# Patient Record
Sex: FEMALE | Race: WHITE | Employment: FULL TIME | URBAN - METROPOLITAN AREA
[De-identification: names, ages, dates, MRNs, and addresses within clinical notes are randomized per-mention and may not be internally consistent; named-entity substitution may affect disease eponyms.]

---

## 2018-11-15 ENCOUNTER — TRANSFERRED RECORDS (OUTPATIENT)
Dept: HEALTH INFORMATION MANAGEMENT | Facility: CLINIC | Age: 43
End: 2018-11-15

## 2018-11-30 ENCOUNTER — TRANSFERRED RECORDS (OUTPATIENT)
Dept: HEALTH INFORMATION MANAGEMENT | Facility: CLINIC | Age: 43
End: 2018-11-30

## 2018-12-04 ENCOUNTER — MEDICAL CORRESPONDENCE (OUTPATIENT)
Dept: HEALTH INFORMATION MANAGEMENT | Facility: CLINIC | Age: 43
End: 2018-12-04

## 2018-12-04 ENCOUNTER — TRANSFERRED RECORDS (OUTPATIENT)
Dept: HEALTH INFORMATION MANAGEMENT | Facility: CLINIC | Age: 43
End: 2018-12-04

## 2018-12-07 ENCOUNTER — TRANSFERRED RECORDS (OUTPATIENT)
Dept: HEALTH INFORMATION MANAGEMENT | Facility: CLINIC | Age: 43
End: 2018-12-07

## 2018-12-11 ENCOUNTER — TRANSFERRED RECORDS (OUTPATIENT)
Dept: HEALTH INFORMATION MANAGEMENT | Facility: CLINIC | Age: 43
End: 2018-12-11

## 2019-01-10 NOTE — TELEPHONE ENCOUNTER
FUTURE VISIT INFORMATION      FUTURE VISIT INFORMATION:    Date: 1/14/19    Time: 2:00PM    [place] [facility] [place]:    Referring provider:  Dr Yonathan Sethi     Referring providers clinic:  [facility]    Reason for visit/diagnosis  multinodule goiter    RECORDS REQUESTED FROM:       Clinic name Comments Records Status Imaging Status   [facility] 12/12/18 telephone encounter    12/11/18 ultrasound-guided thyroid FNA with Dr Kaylee Samuel    3/11/14, 5/1/14, 7/31/14, 2/3/15 notes with Dr Diamond Sy    10/1/15, 4/11/16, 12/9/16, 6/9/17, 12/11/17, 11/30/18 notes with Dr Sethi   Care Everywhere    [facility] ph. [phone]   12/7/18 US Head Neck not Thyroid   12/4/18 XR Chest  11/15/18 US Thyroid  Report: Care Everywhere  PACS   [facility]  12/11/18 thyroid FNA  Report: Care Everywhere                          1/10/19 3:51PM sending a request to [facility] to push above images to [place] PACS via RightFax - Amay  [phone]    1/10/19 5:15PM sent a request to [facility] to send out Thyroid slides via RightFax - Amay     1/14/19 2:25PM received outside slides, sending them to surgical pathology for a consult - Amay

## 2019-01-14 ENCOUNTER — PRE VISIT (OUTPATIENT)
Dept: OTOLARYNGOLOGY | Facility: CLINIC | Age: 44
End: 2019-01-14

## 2019-01-14 ENCOUNTER — OFFICE VISIT (OUTPATIENT)
Dept: OTOLARYNGOLOGY | Facility: CLINIC | Age: 44
End: 2019-01-14
Payer: COMMERCIAL

## 2019-01-14 VITALS — BODY MASS INDEX: 30.66 KG/M2 | HEIGHT: 71 IN | WEIGHT: 219 LBS

## 2019-01-14 DIAGNOSIS — E04.9 SUBSTERNAL GOITER: Primary | ICD-10-CM

## 2019-01-14 RX ORDER — LIRAGLUTIDE 6 MG/ML
INJECTION SUBCUTANEOUS
COMMUNITY
Start: 2018-12-10

## 2019-01-14 RX ORDER — METFORMIN HCL 500 MG
2000 TABLET, EXTENDED RELEASE 24 HR ORAL
COMMUNITY
Start: 2018-11-30

## 2019-01-14 RX ORDER — INSULIN GLARGINE 100 [IU]/ML
INJECTION, SOLUTION SUBCUTANEOUS
COMMUNITY
Start: 2018-12-03

## 2019-01-14 RX ORDER — ESCITALOPRAM OXALATE 20 MG/1
20 TABLET ORAL
COMMUNITY
Start: 2018-10-29

## 2019-01-14 RX ORDER — LANCING DEVICE/LANCETS
KIT MISCELLANEOUS
COMMUNITY
Start: 2017-02-06

## 2019-01-14 RX ORDER — FLUTICASONE PROPIONATE 50 MCG
1-2 SPRAY, SUSPENSION (ML) NASAL
COMMUNITY
Start: 2017-06-28

## 2019-01-14 RX ORDER — SYRINGE-NEEDLE,INSULIN,0.5 ML 27GX1/2"
1 SYRINGE, EMPTY DISPOSABLE MISCELLANEOUS
COMMUNITY
Start: 2016-12-09

## 2019-01-14 RX ORDER — ATORVASTATIN CALCIUM 20 MG/1
20 TABLET, FILM COATED ORAL
COMMUNITY
Start: 2018-11-30

## 2019-01-14 RX ORDER — LANCETS
EACH MISCELLANEOUS
COMMUNITY
Start: 2018-03-07

## 2019-01-14 RX ORDER — LANCETS
1 EACH MISCELLANEOUS
COMMUNITY
Start: 2017-12-11

## 2019-01-14 RX ORDER — ALBUTEROL SULFATE 90 UG/1
2 AEROSOL, METERED RESPIRATORY (INHALATION)
COMMUNITY
Start: 2018-10-29

## 2019-01-14 RX ORDER — CETIRIZINE HYDROCHLORIDE 10 MG/1
1 TABLET ORAL EVERY 24 HOURS
COMMUNITY
Start: 2010-04-26

## 2019-01-14 RX ORDER — EPINEPHRINE 0.3 MG/.3ML
0.3 INJECTION SUBCUTANEOUS
COMMUNITY
Start: 2018-10-29

## 2019-01-14 ASSESSMENT — PAIN SCALES - GENERAL: PAINLEVEL: NO PAIN (0)

## 2019-01-14 ASSESSMENT — MIFFLIN-ST. JEOR: SCORE: 1744.51

## 2019-01-14 NOTE — NURSING NOTE
Chief Complaint   Patient presents with     Consult     multinodule goiter - having trouble swallowing and feeling of pressure in her head when her arms are raised.      Gregorio Casarez, EMT

## 2019-01-14 NOTE — LETTER
"2019       RE: Tanya Oconnor  [address]  [facility] [address]     Dear Colleague,    Thank you for referring your patient, Tanya Oconnor, to the [facility] THRO[facility] at [facility]. Please see a copy of my visit note below.    ENT Surgery Clinic Note      RE: Tanya Oconnor  : 1975  STANISLAV: 2019    HPI:   Tanya Oconnor is a 43 year old female with hx of type II diabetes who presents with multinodular goiter. She reports that symptoms began about 10 years ago, and she has had intermittent dysphagia. This has gradually worsened in size and her PCP recently obtained a thyroid ultrasound. This revealed two nodules which were determined to be benign follicular nodules. Her labs indicate TSH 1.11, FT4 0.9, FT3 2.9, and calcitonin < 2.0.     She denies trouble with voice quality or inspiration. She has not had symptoms of hypo or hyperthyroidism. She is not on thyroid hormone replacement She has no history of head/neck radiation. Her grandfather was on synthroid (she is unsure of why). Otherwise no family history of thyroid disorders or thyroid cancer. 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    Physical examination:     Vitals: Ht 1.803 m (5' 11\")   Wt 99.3 kg (219 lb)   BMI 30.54 kg/m     BMI= Body mass index is 30.54 kg/m .    No acute distress   Trachea deviated to the right  Large multinodular goiter (L>R)  Inferior border of goiter difficult to palpate    Laboratory data:    TSH 1.11  FT4 0.9  FT3 2.9  Calcitonin < 2.0  FSH 6.5  Ca 9.9  Cr 0.69    For details of past medical history, surgical history, family history, medications, allergies, and review of systems, please see details below.    Medical history:  Type II diabetes    Surgical history:  None    Family history:  Grandfather on synthroid (unsure of why)  No family hx of thyroid disorders or cancers    Medications:  Current Outpatient Medications   Medication Sig " "Dispense Refill     albuterol (PROVENTIL HFA) 108 (90 Base) MCG/ACT inhaler Inhale 2 puffs into the lungs       atorvastatin (LIPITOR) 20 MG tablet Take 20 mg by mouth       blood glucose (ACCU-CHEK FASTCLIX) lancing device USE TO TEST 4 TO 8 TIMES DAILY AS DIRECTED       blood glucose monitoring (ACCU-CHEK FASTCLIX) lancets 1 each       blood glucose monitoring (ACCU-CHEK FASTCLIX) lancets        cetirizine (ZYRTEC) 10 MG tablet Take 1 tablet by mouth every 24 hours       CONTOUR NEXT TEST test strip        EPINEPHrine (ADRENACLICK) 0.3 MG/0.3ML injection 2-pack Inject 0.3 mg into the muscle       escitalopram (LEXAPRO) 20 MG tablet Take 20 mg by mouth       fluticasone (FLONASE) 50 MCG/ACT nasal spray 1-2 sprays       insulin glargine (BASAGLAR KWIKPEN) 100 UNIT/ML pen Inject 47 units daily prn insulin pump breaks. DO NOT FILL until requested       insulin lispro (HUMALOG) 100 UNIT/ML vial  UNITS VIA INSULIN PUMP EVERY DAY       insulin pen needle (B-D U/F) 31G X 8 MM miscellaneous Inject 1 each Subcutaneous       insulin syringe-needle U-100 (BD INSULIN SYRINGE ULTRAFINE) 31G X 5/16\" 0.5 ML miscellaneous Inject 1 each Subcutaneous       levonorgestrel (MIRENA, 52 MG,) 20 MCG/24HR IUD 1 each by Intrauterine route       liraglutide (VICTOZA PEN) 18 MG/3ML solution ADMINISTER 1.8 MG UNDER THE SKIN DAILY       metFORMIN (GLUCOPHAGE-XR) 500 MG 24 hr tablet Take 2,000 mg by mouth         Allergies:  The patientis allergic to penicillins.    Social history:  Social History     Tobacco Use     Smoking status: Never Smoker     Smokeless tobacco: Never Used   Substance Use Topics     Alcohol use: Not on file     Marital status: single.    Review of Systems: 10 point ros reviewed and negative except as above.  Nursing Notes:   Gregorio Casarez, EMT  1/14/2019  3:20 PM  Signed  Chief Complaint   Patient presents with     Consult     multinodule goiter - having trouble swallowing and feeling of pressure in her head when " her arms are raised.      Gregorio Casarez, EMT     US Thyroid 11/15/2018    FINDINGS:    RIGHT LOBE:  Measures 6.1 x 2.0 x 2.0 cm. 1.3 x 1.1 x 0.8 cm cystic and solid nodule with no definitive internal vascularity interpolar region. 1.2 x 1.0 x 0.6 cm cystic and solid nodule with no definitive internal vascularity lower pole..    LEFT LOBE: Measures 7.9 x 3.6 x 3.9 cm. 6.3 x 4.1 x 3.5 cm cystic and solid nodule with internal vascularity lower pole    CENTRAL CERVICAL NODE: No lymph nodes seen.    ISTHMUS: 0.5 cm. Appears unremarkable.    IMPRESSION: There are multiple thyroid nodules as above. The largest is in the lower pole of the left lobe of thyroid measures 6.3 cm in greatest dimension.      FNA 12/11/2018       A. Thyroid, left, fine needle aspiration:         - Consistent with a benign follicular nodule.         B. Thyroid, right, fine needle aspiration:         - Consistent with a benign follicular nodule.        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 Discussed the natural history of multinodular goiter and benign findings on her FNA. Given that she has been experiencing symptoms from size of goiter, recommend surgical excision. Discussed total thyroidectomy. This will be done at [facility].  Will obtain CT scan prior to intervention to further characterize anatomic extent of goiter for surgical planning.     Seen with Dr. Ogden.     Mery Hightower MD  General Surgery PGY-3    Referring Provider:  Yonathan Sethi MD  [facility]  [address] [place] [address]     Primary Care Provider:  Suni Lee      I spent >30 minutes in the care and consultation of this patient and agree with the above note. However after reviewing the CT scan there appears to only be involvement of the left lobe of the thyroid and I recommend a LEFT thyroid lobectomy. Surgical procedure and risks discussed with the patient.      Again, thank you  for allowing me to participate in the care of your patient.      Sincerely,    Shakira Ogden MD

## 2019-01-14 NOTE — PATIENT INSTRUCTIONS
1. You can schedule your CT scan prior to surgery.     BEFORE SURGERY:    -NO IBUPROFEN , MOTRIN, ALEVE, GARLIC SUPPLEMENTS, ASPIRIN PRODUCTS  OR FISH OIL FOR 7 DAYS PRIOR TO SURGERY. Tylenol is fine, generally.( You will need specific instructions from primary care if on blood thinners).    -Read pre-operative pamphlets/ booklets, call or contact us with questions.    - Pre-op History and Physical to be done by primary care physician within 30 days of surgery date. This form is in the packet.    - Restrictions on food and fluid the day of surgery as per packet. Generally, nothing solid to eat 8 hours prior to the procedure. Okay to have clear liquids up to 2 hours before (this includes; water, apple juice, black coffee without any cream or sugar).    - Pre-op scrub, neck down, directions are in your packet. Use the night before surgery and the day of surgery.    - You will need a  the day of surgery.        AFTER SURGERY:    - You will follow up after surgery in approximately 3 week(s).    - Call [phone] for scheduling or general questions     For urgent needs after hours call [phone]. You will speak with the hospital  and should ask to have the ENT resident on call paged.    - Please contact our clinic if you note any of the following:          -drainage soaking 3 cotton balls in one hour for 3 consecutive hours          -persistent pain after pain medication          -Fever>100 degrees x 24 hours or longer          -Significant dizziness, especially of new onset          -Any questions or concerns about your care    Ninoska WALKER RNCC [phone]

## 2019-01-15 ENCOUNTER — HOSPITAL ENCOUNTER (OUTPATIENT)
Dept: CT IMAGING | Facility: CLINIC | Age: 44
Discharge: HOME | End: 2019-01-15
Attending: SURGERY | Admitting: SURGERY
Payer: COMMERCIAL

## 2019-01-15 DIAGNOSIS — E04.9 SUBSTERNAL GOITER: ICD-10-CM

## 2019-01-15 LAB
COPATH REPORT: NORMAL
CREAT BLD-MCNC: 0.8 MG/DL (ref 0.52–1.04)
GFR SERPL CREATININE-BSD FRML MDRD: 78 ML/MIN/{1.73_M2}

## 2019-01-15 PROCEDURE — 25000128 H RX IP 250 OP 636: Performed by: SURGERY

## 2019-01-15 PROCEDURE — 25000125 ZZHC RX 250: Performed by: SURGERY

## 2019-01-15 PROCEDURE — 82565 ASSAY OF CREATININE: CPT

## 2019-01-15 PROCEDURE — 70491 CT SOFT TISSUE NECK W/DYE: CPT

## 2019-01-15 PROCEDURE — 00000346 ZZHCL STATISTIC REVIEW OUTSIDE SLIDES TC 88321: Performed by: SURGERY

## 2019-01-15 RX ORDER — IOPAMIDOL 755 MG/ML
80 INJECTION, SOLUTION INTRAVASCULAR ONCE
Status: COMPLETED | OUTPATIENT
Start: 2019-01-15 | End: 2019-01-15

## 2019-01-15 RX ADMIN — SODIUM CHLORIDE 60 ML: 9 INJECTION, SOLUTION INTRAVENOUS at 07:42

## 2019-01-15 RX ADMIN — IOPAMIDOL 80 ML: 755 INJECTION, SOLUTION INTRAVENOUS at 07:37

## 2019-01-15 NOTE — PROGRESS NOTES
"ENT Surgery Clinic Note      RE: Tanya Oconnor  : 1975  STANISLAV: 2019    HPI:   Tanya Oconnor is a 43 year old female with hx of type II diabetes who presents with multinodular goiter. She reports that symptoms began about 10 years ago, and she has had intermittent dysphagia. This has gradually worsened in size and her PCP recently obtained a thyroid ultrasound. This revealed two nodules which were determined to be benign follicular nodules. Her labs indicate TSH 1.11, FT4 0.9, FT3 2.9, and calcitonin < 2.0.     She denies trouble with voice quality or inspiration. She has not had symptoms of hypo or hyperthyroidism. She is not on thyroid hormone replacement She has no history of head/neck radiation. Her grandfather was on synthroid (she is unsure of why). Otherwise no family history of thyroid disorders or thyroid cancer. 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    Physical examination:     Vitals: Ht 1.803 m (5' 11\")   Wt 99.3 kg (219 lb)   BMI 30.54 kg/m    BMI= Body mass index is 30.54 kg/m .    No acute distress   Trachea deviated to the right  Large multinodular goiter (L>R)  Inferior border of goiter difficult to palpate    Laboratory data:    TSH 1.11  FT4 0.9  FT3 2.9  Calcitonin < 2.0  FSH 6.5  Ca 9.9  Cr 0.69    For details of past medical history, surgical history, family history, medications, allergies, and review of systems, please see details below.    Medical history:  Type II diabetes    Surgical history:  None    Family history:  Grandfather on synthroid (unsure of why)  No family hx of thyroid disorders or cancers    Medications:  Current Outpatient Medications   Medication Sig Dispense Refill     albuterol (PROVENTIL HFA) 108 (90 Base) MCG/ACT inhaler Inhale 2 puffs into the lungs       atorvastatin (LIPITOR) 20 MG tablet Take 20 mg by mouth       blood glucose (ACCU-CHEK FASTCLIX) lancing device USE TO TEST 4 TO 8 TIMES DAILY AS DIRECTED       blood glucose " "monitoring (ACCU-CHEK FASTCLIX) lancets 1 each       blood glucose monitoring (ACCU-CHEK FASTCLIX) lancets        cetirizine (ZYRTEC) 10 MG tablet Take 1 tablet by mouth every 24 hours       CONTOUR NEXT TEST test strip        EPINEPHrine (ADRENACLICK) 0.3 MG/0.3ML injection 2-pack Inject 0.3 mg into the muscle       escitalopram (LEXAPRO) 20 MG tablet Take 20 mg by mouth       fluticasone (FLONASE) 50 MCG/ACT nasal spray 1-2 sprays       insulin glargine (BASAGLAR KWIKPEN) 100 UNIT/ML pen Inject 47 units daily prn insulin pump breaks. DO NOT FILL until requested       insulin lispro (HUMALOG) 100 UNIT/ML vial  UNITS VIA INSULIN PUMP EVERY DAY       insulin pen needle (B-D U/F) 31G X 8 MM miscellaneous Inject 1 each Subcutaneous       insulin syringe-needle U-100 (BD INSULIN SYRINGE ULTRAFINE) 31G X 5/16\" 0.5 ML miscellaneous Inject 1 each Subcutaneous       levonorgestrel (MIRENA, 52 MG,) 20 MCG/24HR IUD 1 each by Intrauterine route       liraglutide (VICTOZA PEN) 18 MG/3ML solution ADMINISTER 1.8 MG UNDER THE SKIN DAILY       metFORMIN (GLUCOPHAGE-XR) 500 MG 24 hr tablet Take 2,000 mg by mouth         Allergies:  The patientis allergic to penicillins.    Social history:  Social History     Tobacco Use     Smoking status: Never Smoker     Smokeless tobacco: Never Used   Substance Use Topics     Alcohol use: Not on file     Marital status: single.    Review of Systems: 10 point ros reviewed and negative except as above.  Nursing Notes:   Gregorio Casarez, EMT  1/14/2019  3:20 PM  Signed  Chief Complaint   Patient presents with     Consult     multinodule goiter - having trouble swallowing and feeling of pressure in her head when her arms are raised.      Gregorio Casarez, EMT     US Thyroid 11/15/2018    FINDINGS:    RIGHT LOBE:  Measures 6.1 x 2.0 x 2.0 cm. 1.3 x 1.1 x 0.8 cm cystic and solid nodule with no definitive internal vascularity interpolar region. 1.2 x 1.0 x 0.6 cm cystic and solid nodule with no " definitive internal vascularity lower pole..    LEFT LOBE: Measures 7.9 x 3.6 x 3.9 cm. 6.3 x 4.1 x 3.5 cm cystic and solid nodule with internal vascularity lower pole    CENTRAL CERVICAL NODE: No lymph nodes seen.    ISTHMUS: 0.5 cm. Appears unremarkable.    IMPRESSION: There are multiple thyroid nodules as above. The largest is in the lower pole of the left lobe of thyroid measures 6.3 cm in greatest dimension.      FNA 12/11/2018       A. Thyroid, left, fine needle aspiration:         - Consistent with a benign follicular nodule.         B. Thyroid, right, fine needle aspiration:         - Consistent with a benign follicular nodule.        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 Discussed the natural history of multinodular goiter and benign findings on her FNA. Given that she has been experiencing symptoms from size of goiter, recommend surgical excision. Discussed total thyroidectomy. [facility] be done at [facility].  Will obtain CT scan prior to intervention to further characterize anatomic extent of goiter for surgical planning.     Seen with Dr. Ogden.     Mery Hightower MD  General Surgery PGY-3    Referring Provider:  Yonathan Sethi MD  [facility]  [address] [place] [address]     Primary Care Provider:  Suni Lee      I spent >30 minutes in the care and consultation of this patient and agree with the above note. However after reviewing the CT scan there appears to only be involvement of the left lobe of the thyroid and I recommend a LEFT thyroid lobectomy. Surgical procedure and risks discussed with the patient.

## 2019-01-29 ENCOUNTER — DOCUMENTATION ONLY (OUTPATIENT)
Dept: OTOLARYNGOLOGY | Facility: CLINIC | Age: 44
End: 2019-01-29

## 2019-01-29 NOTE — PROGRESS NOTES
Patient is scheduled for surgery with Dr. Ogden    Proc:  Left thryoid lobectomy and isthmusectomy    Spoke or left message with: spoke with patient    Date of Surgery: 5/22/19    Location: [facility]    H&P: Scheduled with [facility]     Additional imaging/appointments: post op 6/3/19  1:30    Surgery packet: given at appt        Liss Knutson   ENT [place]-Op Coordinator  [phone]

## 2019-02-15 ENCOUNTER — HEALTH MAINTENANCE LETTER (OUTPATIENT)
Age: 44
End: 2019-02-15

## 2019-05-21 ENCOUNTER — ANESTHESIA EVENT (OUTPATIENT)
Dept: SURGERY | Facility: CLINIC | Age: 44
End: 2019-05-21
Payer: COMMERCIAL

## 2019-05-22 ENCOUNTER — ANESTHESIA (OUTPATIENT)
Dept: SURGERY | Facility: CLINIC | Age: 44
End: 2019-05-22
Payer: COMMERCIAL

## 2019-05-22 ENCOUNTER — HOSPITAL ENCOUNTER (OUTPATIENT)
Facility: CLINIC | Age: 44
Setting detail: OBSERVATION
Discharge: HOME | End: 2019-05-23
Attending: SURGERY | Admitting: SURGERY
Payer: COMMERCIAL

## 2019-05-22 DIAGNOSIS — E04.9 GOITER: Primary | ICD-10-CM

## 2019-05-22 LAB
CREAT SERPL-MCNC: 0.79 MG/DL (ref 0.52–1.04)
GFR SERPL CREATININE-BSD FRML MDRD: >90 ML/MIN/{1.73_M2}
GLUCOSE BLDC GLUCOMTR-MCNC: 122 MG/DL (ref 70–99)
GLUCOSE BLDC GLUCOMTR-MCNC: 136 MG/DL (ref 70–99)
GLUCOSE BLDC GLUCOMTR-MCNC: 145 MG/DL (ref 70–99)
GLUCOSE BLDC GLUCOMTR-MCNC: 149 MG/DL (ref 70–99)
GLUCOSE BLDC GLUCOMTR-MCNC: 158 MG/DL (ref 70–99)
HBA1C MFR BLD: 5.9 % (ref 0–5.6)
HCG UR QL: NEGATIVE
POTASSIUM SERPL-SCNC: 3.8 MMOL/L (ref 3.4–5.3)

## 2019-05-22 PROCEDURE — 25000132 ZZH RX MED GY IP 250 OP 250 PS 637: Performed by: ANESTHESIOLOGY

## 2019-05-22 PROCEDURE — 27211024 ZZHC OR SUPPLY OTHER OPNP: Performed by: SURGERY

## 2019-05-22 PROCEDURE — 25000132 ZZH RX MED GY IP 250 OP 250 PS 637: Performed by: STUDENT IN AN ORGANIZED HEALTH CARE EDUCATION/TRAINING PROGRAM

## 2019-05-22 PROCEDURE — 81025 URINE PREGNANCY TEST: CPT | Performed by: ANESTHESIOLOGY

## 2019-05-22 PROCEDURE — 83036 HEMOGLOBIN GLYCOSYLATED A1C: CPT | Performed by: STUDENT IN AN ORGANIZED HEALTH CARE EDUCATION/TRAINING PROGRAM

## 2019-05-22 PROCEDURE — 82565 ASSAY OF CREATININE: CPT | Performed by: ANESTHESIOLOGY

## 2019-05-22 PROCEDURE — 71000014 ZZH RECOVERY PHASE 1 LEVEL 2 FIRST HR: Performed by: SURGERY

## 2019-05-22 PROCEDURE — 25800030 ZZH RX IP 258 OP 636: Performed by: ANESTHESIOLOGY

## 2019-05-22 PROCEDURE — 40000170 ZZH STATISTIC PRE-PROCEDURE ASSESSMENT II: Performed by: SURGERY

## 2019-05-22 PROCEDURE — 25000565 ZZH ISOFLURANE, EA 15 MIN: Performed by: SURGERY

## 2019-05-22 PROCEDURE — 36000064 ZZH SURGERY LEVEL 4 EA 15 ADDTL MIN - UMMC: Performed by: SURGERY

## 2019-05-22 PROCEDURE — 36415 COLL VENOUS BLD VENIPUNCTURE: CPT | Performed by: ANESTHESIOLOGY

## 2019-05-22 PROCEDURE — 37000008 ZZH ANESTHESIA TECHNICAL FEE, 1ST 30 MIN: Performed by: SURGERY

## 2019-05-22 PROCEDURE — 00000146 ZZHCL STATISTIC GLUCOSE BY METER IP

## 2019-05-22 PROCEDURE — 27210794 ZZH OR GENERAL SUPPLY STERILE: Performed by: SURGERY

## 2019-05-22 PROCEDURE — 88331 PATH CONSLTJ SURG 1 BLK 1SPC: CPT | Performed by: SURGERY

## 2019-05-22 PROCEDURE — 25800030 ZZH RX IP 258 OP 636: Performed by: NURSE ANESTHETIST, CERTIFIED REGISTERED

## 2019-05-22 PROCEDURE — 25000128 H RX IP 250 OP 636: Performed by: NURSE ANESTHETIST, CERTIFIED REGISTERED

## 2019-05-22 PROCEDURE — 25000125 ZZHC RX 250: Performed by: NURSE ANESTHETIST, CERTIFIED REGISTERED

## 2019-05-22 PROCEDURE — 25000125 ZZHC RX 250: Performed by: ANESTHESIOLOGY

## 2019-05-22 PROCEDURE — 88307 TISSUE EXAM BY PATHOLOGIST: CPT | Performed by: SURGERY

## 2019-05-22 PROCEDURE — 71000015 ZZH RECOVERY PHASE 1 LEVEL 2 EA ADDTL HR: Performed by: SURGERY

## 2019-05-22 PROCEDURE — 84132 ASSAY OF SERUM POTASSIUM: CPT | Performed by: ANESTHESIOLOGY

## 2019-05-22 PROCEDURE — 12000001 ZZH R&B MED SURG/OB UMMC

## 2019-05-22 PROCEDURE — 25000128 H RX IP 250 OP 636: Performed by: ANESTHESIOLOGY

## 2019-05-22 PROCEDURE — 37000009 ZZH ANESTHESIA TECHNICAL FEE, EACH ADDTL 15 MIN: Performed by: SURGERY

## 2019-05-22 PROCEDURE — 36000062 ZZH SURGERY LEVEL 4 1ST 30 MIN - UMMC: Performed by: SURGERY

## 2019-05-22 PROCEDURE — 25000127 ZZH RX IP 250 OP 272: Performed by: SURGERY

## 2019-05-22 PROCEDURE — 36415 COLL VENOUS BLD VENIPUNCTURE: CPT | Performed by: STUDENT IN AN ORGANIZED HEALTH CARE EDUCATION/TRAINING PROGRAM

## 2019-05-22 RX ORDER — NICOTINE POLACRILEX 4 MG
15-30 LOZENGE BUCCAL
Status: DISCONTINUED | OUTPATIENT
Start: 2019-05-22 | End: 2019-05-23 | Stop reason: HOSPADM

## 2019-05-22 RX ORDER — DEXTROSE MONOHYDRATE 25 G/50ML
25-50 INJECTION, SOLUTION INTRAVENOUS
Status: DISCONTINUED | OUTPATIENT
Start: 2019-05-22 | End: 2019-05-23 | Stop reason: HOSPADM

## 2019-05-22 RX ORDER — NALOXONE HYDROCHLORIDE 0.4 MG/ML
.1-.4 INJECTION, SOLUTION INTRAMUSCULAR; INTRAVENOUS; SUBCUTANEOUS
Status: DISCONTINUED | OUTPATIENT
Start: 2019-05-22 | End: 2019-05-22 | Stop reason: HOSPADM

## 2019-05-22 RX ORDER — LIDOCAINE 40 MG/G
CREAM TOPICAL
Status: DISCONTINUED | OUTPATIENT
Start: 2019-05-22 | End: 2019-05-23 | Stop reason: HOSPADM

## 2019-05-22 RX ORDER — SCOLOPAMINE TRANSDERMAL SYSTEM 1 MG/1
1 PATCH, EXTENDED RELEASE TRANSDERMAL
Status: DISCONTINUED | OUTPATIENT
Start: 2019-05-22 | End: 2019-05-22

## 2019-05-22 RX ORDER — DEXTROSE MONOHYDRATE 25 G/50ML
25-50 INJECTION, SOLUTION INTRAVENOUS
Status: DISCONTINUED | OUTPATIENT
Start: 2019-05-22 | End: 2019-05-22

## 2019-05-22 RX ORDER — OXYCODONE HYDROCHLORIDE 5 MG/1
5 TABLET ORAL EVERY 6 HOURS PRN
Qty: 12 TABLET | Refills: 0 | Status: SHIPPED | OUTPATIENT
Start: 2019-05-22 | End: 2019-05-25

## 2019-05-22 RX ORDER — LIDOCAINE 40 MG/G
CREAM TOPICAL
Status: DISCONTINUED | OUTPATIENT
Start: 2019-05-22 | End: 2019-05-22 | Stop reason: HOSPADM

## 2019-05-22 RX ORDER — SODIUM CHLORIDE, SODIUM LACTATE, POTASSIUM CHLORIDE, CALCIUM CHLORIDE 600; 310; 30; 20 MG/100ML; MG/100ML; MG/100ML; MG/100ML
INJECTION, SOLUTION INTRAVENOUS CONTINUOUS
Status: DISCONTINUED | OUTPATIENT
Start: 2019-05-22 | End: 2019-05-22 | Stop reason: HOSPADM

## 2019-05-22 RX ORDER — HYDROMORPHONE HYDROCHLORIDE 1 MG/ML
.3-.5 INJECTION, SOLUTION INTRAMUSCULAR; INTRAVENOUS; SUBCUTANEOUS EVERY 10 MIN PRN
Status: DISCONTINUED | OUTPATIENT
Start: 2019-05-22 | End: 2019-05-22 | Stop reason: HOSPADM

## 2019-05-22 RX ORDER — MEPERIDINE HYDROCHLORIDE 25 MG/ML
12.5 INJECTION INTRAMUSCULAR; INTRAVENOUS; SUBCUTANEOUS
Status: DISCONTINUED | OUTPATIENT
Start: 2019-05-22 | End: 2019-05-22 | Stop reason: HOSPADM

## 2019-05-22 RX ORDER — PROPOFOL 10 MG/ML
INJECTION, EMULSION INTRAVENOUS CONTINUOUS PRN
Status: DISCONTINUED | OUTPATIENT
Start: 2019-05-22 | End: 2019-05-22

## 2019-05-22 RX ORDER — FENTANYL CITRATE 50 UG/ML
25-50 INJECTION, SOLUTION INTRAMUSCULAR; INTRAVENOUS
Status: DISCONTINUED | OUTPATIENT
Start: 2019-05-22 | End: 2019-05-22 | Stop reason: HOSPADM

## 2019-05-22 RX ORDER — LIDOCAINE HYDROCHLORIDE 20 MG/ML
INJECTION, SOLUTION INFILTRATION; PERINEURAL PRN
Status: DISCONTINUED | OUTPATIENT
Start: 2019-05-22 | End: 2019-05-22

## 2019-05-22 RX ORDER — OXYCODONE HYDROCHLORIDE 5 MG/1
5-10 TABLET ORAL EVERY 4 HOURS PRN
Status: DISCONTINUED | OUTPATIENT
Start: 2019-05-22 | End: 2019-05-22

## 2019-05-22 RX ORDER — DEXAMETHASONE SODIUM PHOSPHATE 10 MG/ML
INJECTION, SOLUTION INTRAMUSCULAR; INTRAVENOUS PRN
Status: DISCONTINUED | OUTPATIENT
Start: 2019-05-22 | End: 2019-05-22

## 2019-05-22 RX ORDER — FENTANYL CITRATE 50 UG/ML
INJECTION, SOLUTION INTRAMUSCULAR; INTRAVENOUS PRN
Status: DISCONTINUED | OUTPATIENT
Start: 2019-05-22 | End: 2019-05-22

## 2019-05-22 RX ORDER — CETIRIZINE HYDROCHLORIDE 10 MG/1
10 TABLET ORAL EVERY 24 HOURS
Status: DISCONTINUED | OUTPATIENT
Start: 2019-05-23 | End: 2019-05-23 | Stop reason: HOSPADM

## 2019-05-22 RX ORDER — NICOTINE POLACRILEX 4 MG
15-30 LOZENGE BUCCAL
Status: DISCONTINUED | OUTPATIENT
Start: 2019-05-22 | End: 2019-05-22

## 2019-05-22 RX ORDER — ALBUTEROL SULFATE 90 UG/1
2 AEROSOL, METERED RESPIRATORY (INHALATION) EVERY 4 HOURS PRN
Status: DISCONTINUED | OUTPATIENT
Start: 2019-05-22 | End: 2019-05-23 | Stop reason: HOSPADM

## 2019-05-22 RX ORDER — ONDANSETRON 2 MG/ML
4 INJECTION INTRAMUSCULAR; INTRAVENOUS EVERY 30 MIN PRN
Status: DISCONTINUED | OUTPATIENT
Start: 2019-05-22 | End: 2019-05-22 | Stop reason: HOSPADM

## 2019-05-22 RX ORDER — ACETAMINOPHEN 325 MG/1
975 TABLET ORAL ONCE
Status: DISCONTINUED | OUTPATIENT
Start: 2019-05-22 | End: 2019-05-22 | Stop reason: HOSPADM

## 2019-05-22 RX ORDER — PROPOFOL 10 MG/ML
INJECTION, EMULSION INTRAVENOUS PRN
Status: DISCONTINUED | OUTPATIENT
Start: 2019-05-22 | End: 2019-05-22

## 2019-05-22 RX ORDER — ACETAMINOPHEN 325 MG/1
975 TABLET ORAL EVERY 4 HOURS PRN
Status: DISCONTINUED | OUTPATIENT
Start: 2019-05-22 | End: 2019-05-22

## 2019-05-22 RX ORDER — SODIUM CHLORIDE, SODIUM LACTATE, POTASSIUM CHLORIDE, CALCIUM CHLORIDE 600; 310; 30; 20 MG/100ML; MG/100ML; MG/100ML; MG/100ML
INJECTION, SOLUTION INTRAVENOUS CONTINUOUS PRN
Status: DISCONTINUED | OUTPATIENT
Start: 2019-05-22 | End: 2019-05-22

## 2019-05-22 RX ORDER — ESCITALOPRAM OXALATE 20 MG/1
20 TABLET ORAL AT BEDTIME
Status: DISCONTINUED | OUTPATIENT
Start: 2019-05-22 | End: 2019-05-23

## 2019-05-22 RX ORDER — OXYCODONE HCL 5 MG/5 ML
5 SOLUTION, ORAL ORAL EVERY 4 HOURS PRN
Status: DISCONTINUED | OUTPATIENT
Start: 2019-05-22 | End: 2019-05-23 | Stop reason: HOSPADM

## 2019-05-22 RX ORDER — ONDANSETRON 4 MG/1
4 TABLET, ORALLY DISINTEGRATING ORAL EVERY 30 MIN PRN
Status: DISCONTINUED | OUTPATIENT
Start: 2019-05-22 | End: 2019-05-22 | Stop reason: HOSPADM

## 2019-05-22 RX ORDER — ATORVASTATIN CALCIUM 20 MG/1
20 TABLET, FILM COATED ORAL DAILY
Status: DISCONTINUED | OUTPATIENT
Start: 2019-05-23 | End: 2019-05-23 | Stop reason: HOSPADM

## 2019-05-22 RX ORDER — ACETAMINOPHEN 325 MG/1
650 TABLET ORAL EVERY 6 HOURS PRN
Status: DISCONTINUED | OUTPATIENT
Start: 2019-05-22 | End: 2019-05-23 | Stop reason: HOSPADM

## 2019-05-22 RX ORDER — ONDANSETRON 2 MG/ML
INJECTION INTRAMUSCULAR; INTRAVENOUS PRN
Status: DISCONTINUED | OUTPATIENT
Start: 2019-05-22 | End: 2019-05-22

## 2019-05-22 RX ADMIN — PHENYLEPHRINE HYDROCHLORIDE 100 MCG: 10 INJECTION INTRAVENOUS at 09:36

## 2019-05-22 RX ADMIN — FENTANYL CITRATE 25 MCG: 50 INJECTION, SOLUTION INTRAMUSCULAR; INTRAVENOUS at 11:25

## 2019-05-22 RX ADMIN — Medication 0.3 MG: at 11:49

## 2019-05-22 RX ADMIN — OXYCODONE HYDROCHLORIDE 5 MG: 5 SOLUTION ORAL at 17:32

## 2019-05-22 RX ADMIN — SCOPALAMINE 1 PATCH: 1 PATCH, EXTENDED RELEASE TRANSDERMAL at 07:14

## 2019-05-22 RX ADMIN — DEXAMETHASONE SODIUM PHOSPHATE 4 MG: 10 INJECTION, SOLUTION INTRAMUSCULAR; INTRAVENOUS at 08:43

## 2019-05-22 RX ADMIN — PROPOFOL 150 MG: 10 INJECTION, EMULSION INTRAVENOUS at 08:42

## 2019-05-22 RX ADMIN — Medication 0.4 MG: at 12:09

## 2019-05-22 RX ADMIN — ACETAMINOPHEN 975 MG: 325 TABLET, FILM COATED ORAL at 06:57

## 2019-05-22 RX ADMIN — LIDOCAINE HYDROCHLORIDE 100 MG: 20 INJECTION, SOLUTION INFILTRATION; PERINEURAL at 08:41

## 2019-05-22 RX ADMIN — PHENYLEPHRINE HYDROCHLORIDE 0.05 MCG/KG/MIN: 10 INJECTION INTRAVENOUS at 09:46

## 2019-05-22 RX ADMIN — OXYCODONE HYDROCHLORIDE 5 MG: 5 SOLUTION ORAL at 21:41

## 2019-05-22 RX ADMIN — PHENYLEPHRINE HYDROCHLORIDE 100 MCG: 10 INJECTION INTRAVENOUS at 09:28

## 2019-05-22 RX ADMIN — ACETAMINOPHEN 650 MG: 325 TABLET, FILM COATED ORAL at 21:41

## 2019-05-22 RX ADMIN — PHENYLEPHRINE HYDROCHLORIDE 200 MCG: 10 INJECTION INTRAVENOUS at 09:18

## 2019-05-22 RX ADMIN — FENTANYL CITRATE 100 MCG: 50 INJECTION, SOLUTION INTRAMUSCULAR; INTRAVENOUS at 08:38

## 2019-05-22 RX ADMIN — MIDAZOLAM 2 MG: 1 INJECTION INTRAMUSCULAR; INTRAVENOUS at 08:35

## 2019-05-22 RX ADMIN — PHENYLEPHRINE HYDROCHLORIDE 100 MCG: 10 INJECTION INTRAVENOUS at 09:33

## 2019-05-22 RX ADMIN — FENTANYL CITRATE 50 MCG: 50 INJECTION, SOLUTION INTRAMUSCULAR; INTRAVENOUS at 11:20

## 2019-05-22 RX ADMIN — PROPOFOL 50 MCG/KG/MIN: 10 INJECTION, EMULSION INTRAVENOUS at 08:55

## 2019-05-22 RX ADMIN — SODIUM CHLORIDE, POTASSIUM CHLORIDE, SODIUM LACTATE AND CALCIUM CHLORIDE: 600; 310; 30; 20 INJECTION, SOLUTION INTRAVENOUS at 08:53

## 2019-05-22 RX ADMIN — Medication 100 MG: at 08:42

## 2019-05-22 RX ADMIN — FENTANYL CITRATE 25 MCG: 50 INJECTION, SOLUTION INTRAMUSCULAR; INTRAVENOUS at 11:42

## 2019-05-22 RX ADMIN — PROPOFOL 50 MG: 10 INJECTION, EMULSION INTRAVENOUS at 08:49

## 2019-05-22 RX ADMIN — OXYCODONE HYDROCHLORIDE 5 MG: 5 SOLUTION ORAL at 13:03

## 2019-05-22 RX ADMIN — REMIFENTANIL HYDROCHLORIDE 0.05 MCG/KG/MIN: 1 INJECTION, POWDER, LYOPHILIZED, FOR SOLUTION INTRAVENOUS at 08:51

## 2019-05-22 RX ADMIN — SODIUM CHLORIDE, POTASSIUM CHLORIDE, SODIUM LACTATE AND CALCIUM CHLORIDE: 600; 310; 30; 20 INJECTION, SOLUTION INTRAVENOUS at 08:31

## 2019-05-22 RX ADMIN — ONDANSETRON 8 MG: 2 INJECTION INTRAMUSCULAR; INTRAVENOUS at 10:26

## 2019-05-22 RX ADMIN — ACETAMINOPHEN 650 MG: 325 TABLET, FILM COATED ORAL at 15:04

## 2019-05-22 ASSESSMENT — ACTIVITIES OF DAILY LIVING (ADL)
RETIRED_EATING: 0-->INDEPENDENT
ADLS_ACUITY_SCORE: 11
TRANSFERRING: 0-->INDEPENDENT
SWALLOWING: 2-->DIFFICULTY SWALLOWING LIQUIDS/FOODS
TOILETING: 0-->INDEPENDENT
AMBULATION: 0-->INDEPENDENT
BATHING: 0-->INDEPENDENT
ADLS_ACUITY_SCORE: 17
RETIRED_COMMUNICATION: 0-->UNDERSTANDS/COMMUNICATES WITHOUT DIFFICULTY
FALL_HISTORY_WITHIN_LAST_SIX_MONTHS: NO
DRESS: 0-->INDEPENDENT
COGNITION: 0 - NO COGNITION ISSUES REPORTED

## 2019-05-22 ASSESSMENT — PAIN DESCRIPTION - DESCRIPTORS
DESCRIPTORS: ACHING
DESCRIPTORS: SORE
DESCRIPTORS: ACHING
DESCRIPTORS: ACHING;SORE
DESCRIPTORS: ACHING

## 2019-05-22 ASSESSMENT — MIFFLIN-ST. JEOR: SCORE: 1731.13

## 2019-05-22 NOTE — PLAN OF CARE
Writer responsible for patient [id number]. AVSS on RA, Capno WNL. A+OX4. Pain controlled with prn oxycodone and tylenol. Denies nausea, moderate carb diet with good intake. Started on ambulatory insulin infusion pump, see MAR for details. Neck incision sutured c/d/I. FABIOLA to bulb sx with brown output, c/d/I. PIV's SL'd. UAL, steady on feet, ambulating long distances in hallway.     Continue POC.

## 2019-05-22 NOTE — DISCHARGE SUMMARY
SURGICAL ONCOLOGY DISCHARGE SUMMARY     NAME: Tanya Oconnor   MRN: [id number]   : 1975     DATE OF ADMISSION: 2019     PRE/POSTOPERATIVE DIAGNOSES: Left thyroid goiter     PROCEDURES PERFORMED: Left thyroid lobectomy     PATHOLOGY RESULTS: Pending      CULTURE RESULTS: None     INTRAOPERATIVE COMPLICATIONS: None     POSTOPERATIVE COMPLICATIONS: None     DRAINS/TUBES PRESENT AT DISCHARGE: None    DATE OF DISCHARGE:      HOSPITAL COURSE: Tanya Oconnor is a 43 year old female who on 2019  underwent the above-named procedures.  she tolerated the operation well and postoperatively was transferred to the general post-surgical unit.  The remainder of her course was essentially uncomplicated.  Prior to discharge, her pain was controlled well, she was able to perform ADLs and ambulate independently without difficulty. Her FABIOLA drain output was minimal and it was removed. She was discharged to home in stable condition     DISCHARGE EXAM:   A&O, NAD  Resp non-labored  Distal extremities warm    Incisions C/D     DISCHARGE INSTRUCTIONS:  No discharge procedures on file.    DISCHARGE MEDICATIONS:   Current Discharge Medication List      CONTINUE these medications which have NOT CHANGED    Details   atorvastatin (LIPITOR) 20 MG tablet Take 20 mg by mouth      cetirizine (ZYRTEC) 10 MG tablet Take 1 tablet by mouth every 24 hours      escitalopram (LEXAPRO) 20 MG tablet Take 20 mg by mouth      insulin lispro (HUMALOG) 100 UNIT/ML vial  UNITS VIA INSULIN PUMP EVERY DAY      levonorgestrel (MIRENA, 52 MG,) 20 MCG/24HR IUD 1 each by Intrauterine route      liraglutide (VICTOZA PEN) 18 MG/3ML solution ADMINISTER 1.8 MG UNDER THE SKIN DAILY      metFORMIN (GLUCOPHAGE-XR) 500 MG 24 hr tablet Take 2,000 mg by mouth      albuterol (PROVENTIL HFA) 108 (90 Base) MCG/ACT inhaler Inhale 2 puffs into the lungs      blood glucose (ACCU-CHEK FASTCLIX) lancing device USE TO TEST 4 TO 8 TIMES DAILY AS DIRECTED      !!  "blood glucose monitoring (ACCU-CHEK FASTCLIX) lancets 1 each      !! blood glucose monitoring (ACCU-CHEK FASTCLIX) lancets       CONTOUR NEXT TEST test strip       EPINEPHrine (ADRENACLICK) 0.3 MG/0.3ML injection 2-pack Inject 0.3 mg into the muscle      fluticasone (FLONASE) 50 MCG/ACT nasal spray 1-2 sprays      insulin glargine (BASAGLAR KWIKPEN) 100 UNIT/ML pen Inject 47 units daily prn insulin pump breaks. DO NOT FILL until requested      insulin pen needle (B-D U/F) 31G X 8 MM miscellaneous Inject 1 each Subcutaneous      insulin syringe-needle U-100 (BD INSULIN SYRINGE ULTRAFINE) 31G X 5/16\" 0.5 ML miscellaneous Inject 1 each Subcutaneous       !! - Potential duplicate medications found. Please discuss with provider.                   "

## 2019-05-22 NOTE — ANESTHESIA POSTPROCEDURE EVALUATION
Anesthesia POST Procedure Evaluation    Patient: Tanya Oconnor   MRN:     [id number] Gender:   female   Age:    43 year old :      1975        Preoperative Diagnosis: Substernal Goiter   Procedure(s):  Left Thyroid Lobectomy   Postop Comments: No value filed.       Anesthesia Type:  General  No value filed.    Reportable Event: NO     PAIN: Uncomplicated   Sign Out status: Comfortable, Well controlled pain     PONV: No PONV   Sign Out status:  No Nausea or Vomiting     Neuro/Psych: Uneventful perioperative course   Sign Out Status: Preoperative baseline; Age appropriate mentation     Airway/Resp.: Uneventful perioperative course   Sign Out Status: Non labored breathing, age appropriate RR; Resp. Status within EXPECTED Parameters     CV: Uneventful perioperative course   Sign Out status: Appropriate BP and perfusion indices; Appropriate HR/Rhythm     Disposition:   Sign Out in:  PACU  Disposition:  Floor  Recovery Course: Uneventful  Follow-Up: Not required     Comments/Narrative:  Being admitted to obs for high bleeding risk due to size of mass.           Last Anesthesia Record Vitals:  CRNA VITALS  2019 1041 - 2019 1141      2019             EKG:  Sinus rhythm          Last PACU Vitals:  Vitals Value Taken Time   /78 2019 12:10 PM   Temp 36.7  C (98  F) 2019 11:45 AM   Pulse 92 2019 12:10 PM   Resp 10 2019 11:45 AM   SpO2 98 % 2019 12:17 PM   Temp src     NIBP     Pulse     SpO2     Resp     Temp     Ht Rate     Temp 2     Vitals shown include unvalidated device data.      Electronically Signed By: Brook Sahu MD, May 22, 2019, 12:18 PM

## 2019-05-22 NOTE — PLAN OF CARE
Patient arrived on unit 7C @ 1345. A&O x4 and ambulated to hospital bed from stretcher. Denies N/V, tolerating ice chips and water, denies dizziness. Pain adequately managed /w PRN Tylenol and PRN Oxycodone 5mg. AVSS on RA, capno in place.    Juana Moreno RN on 5/22/2019 at 3:31 PM

## 2019-05-22 NOTE — ANESTHESIA PREPROCEDURE EVALUATION
"Anesthesia Pre-Procedure Evaluation    Patient: Tanya Oconnor   MRN:     [id number] Gender:   female   Age:    43 year old :      1975        Preoperative Diagnosis: Substernal Goiter   Procedure(s):  Left Thyroid Lobectomy And Isthmusectomy     Past Medical History:   Diagnosis Date     Diabetes (H)      Thyroid disease      Uncomplicated asthma       History reviewed. No pertinent surgical history.       Anesthesia Evaluation     . Pt has not had prior anesthetic            ROS/MED HX    ENT/Pulmonary:     (+)Intermittent asthma (No inhaler use x mnay months) , . .    Neurologic:       Cardiovascular:         METS/Exercise Tolerance:  >4 METS   Hematologic:         Musculoskeletal:         GI/Hepatic:        (-) GERD   Renal/Genitourinary:         Endo:     (+) type II DM Using insulin - using insulin pump thyroid problem (thyroid nodule) .      Psychiatric:     (+) psychiatric history depression      Infectious Disease:         Malignancy:         Other:    (+) no H/O Chronic Pain,                       PHYSICAL EXAM:   Mental Status/Neuro:    Airway: Facies: Feasible  Mallampati: I  Mouth/Opening: Not Assessed  TM distance: > 6 cm  Neck ROM: Full   Respiratory: Auscultation: CTAB     Resp. Rate: Normal     Resp. Effort: Normal      CV: Rhythm: Regular  Rate: Age appropriate  Heart: Normal Sounds   Comments:                    Lab Results   Component Value Date    POTASSIUM 3.8 2019    CR 0.79 2019    HCG Negative 2019       Preop Vitals  BP Readings from Last 3 Encounters:   19 123/84    Pulse Readings from Last 3 Encounters:   19 100      Resp Readings from Last 3 Encounters:   19 16    SpO2 Readings from Last 3 Encounters:   19 97%      Temp Readings from Last 1 Encounters:   19 36.6  C (97.9  F) (Oral)    Ht Readings from Last 1 Encounters:   19 1.803 m (5' 11\")      Wt Readings from Last 1 Encounters:   19 98 kg (216 lb 0.8 oz)    Estimated " "body mass index is 30.13 kg/m  as calculated from the following:    Height as of this encounter: 1.803 m (5' 11\").    Weight as of this encounter: 98 kg (216 lb 0.8 oz).     LDA:  ETT (adult) (Active)   Number of days: 0            Assessment:   ASA SCORE: 2    NPO Status: > 6 hours since completed [employer]   Documentation: H&P complete; Preop Testing complete; Consents complete   Proceeding: Proceed without further delay  Tobacco Use:  NO Active use of Tobacco/UNKNOWN Tobacco use status     Plan:   Anes. Type:  General   Pre-Induction: Midazolam IV; Acetaminophen PO   Induction:  IV (Standard)   Airway: Oral ETT   Access/Monitoring: PIV; 2nd PIV   Maintenance: Balanced   Emergence: Procedure Site   Logistics: Same Day Surgery     Postop Pain/Sedation Strategy:  Standard-Options: Opioids PRN     PONV Management:  Adult Risk Factors: Female, Non-Smoker, Postop Opioids  Prevention: Ondansetron; Dexamethasone; Scop. Patch     CONSENT: Direct conversation   Plan and risks discussed with: Patient   Blood Products: Consent Deferred (Minimal Blood Loss)       Comments for Plan/Consent:  43F with DM and thyroid nodule here for thyroidectomy.  ASA 2.  Plan: GETA, PONV prophylaxis.                         Brook Sahu MD  "

## 2019-05-22 NOTE — BRIEF OP NOTE
[facility], [place]    Brief Operative Note    Pre-operative diagnosis: Substernal Goiter  Post-operative diagnosis * No post-op diagnosis entered *  Procedure: Procedure(s):  Left Thyroid Lobectomy  Surgeon: Surgeon(s) and Role:     * Shakira Ogden MD - Primary     * Leonarda Rincon MD - Resident - Assisting  Anesthesia: General   Estimated blood loss: 20 cc  Drains: Left neck FABIOLA  Specimens:   ID Type Source Tests Collected by Time Destination   A : left lobe of thyroid  Tissue Thyroid, left SURGICAL PATHOLOGY EXAM Shakira Ogden MD 5/22/2019 10:27 AM      Findings:   Large goiterous left lobe of thyroid,left superior parathyroid removed with specimen and not implanted .  Complications: None.  Implants:  * No implants in log *       Plan    - Admit to obs  - ADAT  - PRN oxycodone  - sliding scale insulin today, restart home meds tomorrow   - TKO IVF  - FABIOLA to bulb suction, remove 5/23 prior to discharge

## 2019-05-22 NOTE — ANESTHESIA CARE TRANSFER NOTE
Patient: Tanya Oconnor    Procedure(s):  Left Thyroid Lobectomy    Diagnosis: Substernal Goiter  Diagnosis Additional Information: No value filed.    Anesthesia Type:   No value filed.     Note:  Airway :Face Mask  Patient transferred to:PACU  Comments: Pt. Breathing spont, suctioned and extubated.  To PACU.  Report to RN.Handoff Report: Identifed the Patient, Identified the Reponsible Provider, Reviewed the pertinent medical history, Discussed the surgical course, Reviewed Intra-OP anesthesia mangement and issues during anesthesia, Set expectations for post-procedure period and Allowed opportunity for questions and acknowledgement of understanding      Vitals: (Last set prior to Anesthesia Care Transfer)    CRNA VITALS  5/22/2019 1041 - 5/22/2019 1120      5/22/2019             Resp Rate (observed):  1  (Abnormal)                 Electronically Signed By: DAVID Del Toro CRNA  May 22, 2019  11:20 AM

## 2019-05-23 VITALS
WEIGHT: 216.05 LBS | BODY MASS INDEX: 30.25 KG/M2 | RESPIRATION RATE: 15 BRPM | OXYGEN SATURATION: 94 % | HEART RATE: 78 BPM | DIASTOLIC BLOOD PRESSURE: 66 MMHG | SYSTOLIC BLOOD PRESSURE: 113 MMHG | TEMPERATURE: 97.1 F | HEIGHT: 71 IN

## 2019-05-23 LAB
GLUCOSE BLDC GLUCOMTR-MCNC: 131 MG/DL (ref 70–99)
GLUCOSE BLDC GLUCOMTR-MCNC: 142 MG/DL (ref 70–99)
GLUCOSE BLDC GLUCOMTR-MCNC: 154 MG/DL (ref 70–99)

## 2019-05-23 PROCEDURE — 25000132 ZZH RX MED GY IP 250 OP 250 PS 637: Performed by: STUDENT IN AN ORGANIZED HEALTH CARE EDUCATION/TRAINING PROGRAM

## 2019-05-23 PROCEDURE — 25000132 ZZH RX MED GY IP 250 OP 250 PS 637: Performed by: ANESTHESIOLOGY

## 2019-05-23 PROCEDURE — 96372 THER/PROPH/DIAG INJ SC/IM: CPT

## 2019-05-23 PROCEDURE — 00000146 ZZHCL STATISTIC GLUCOSE BY METER IP

## 2019-05-23 PROCEDURE — 25000132 ZZH RX MED GY IP 250 OP 250 PS 637: Performed by: SURGERY

## 2019-05-23 PROCEDURE — G0378 HOSPITAL OBSERVATION PER HR: HCPCS

## 2019-05-23 RX ORDER — ESCITALOPRAM OXALATE 20 MG/1
20 TABLET ORAL EVERY MORNING
Status: DISCONTINUED | OUTPATIENT
Start: 2019-05-23 | End: 2019-05-23 | Stop reason: HOSPADM

## 2019-05-23 RX ADMIN — ESCITALOPRAM OXALATE 20 MG: 20 TABLET ORAL at 08:05

## 2019-05-23 RX ADMIN — ATORVASTATIN CALCIUM 20 MG: 20 TABLET, FILM COATED ORAL at 07:49

## 2019-05-23 RX ADMIN — ACETAMINOPHEN 650 MG: 325 TABLET, FILM COATED ORAL at 11:03

## 2019-05-23 RX ADMIN — ACETAMINOPHEN 650 MG: 325 TABLET, FILM COATED ORAL at 04:58

## 2019-05-23 RX ADMIN — CETIRIZINE HYDROCHLORIDE 10 MG: 10 TABLET, FILM COATED ORAL at 07:49

## 2019-05-23 RX ADMIN — OXYCODONE HYDROCHLORIDE 5 MG: 5 SOLUTION ORAL at 04:58

## 2019-05-23 ASSESSMENT — PAIN DESCRIPTION - DESCRIPTORS
DESCRIPTORS: ACHING
DESCRIPTORS: ACHING

## 2019-05-23 ASSESSMENT — ACTIVITIES OF DAILY LIVING (ADL)
ADLS_ACUITY_SCORE: 11

## 2019-05-23 NOTE — PLAN OF CARE
Assumed care of Patient from 1900-0730. AVSS on RA. CAPNO WNL. Patient reports good pain control with PRN tylenol and oxycodone. Tolerating Moderate carb diet. Denies nausea. Ambulattory insulin pump, see MAR for details. Neck incision CDI. FABIOLA with dark brown output. Voiding not saving, +Gas, - BM overnight. PIV SL. Up independently. Possible discharge today. Continue with POC.

## 2019-05-23 NOTE — PLAN OF CARE
7C--home she left ambulatory ~ 1:30 pm with her sister. I have given her written and verbal discharge instructions and answered her questions. She declined the need to take a pain med before leaving. Tylenol last given at 11 am. No neck swelling or swallowing issues noted.

## 2019-05-23 NOTE — PLAN OF CARE
FABIOLA removed by MD on morning rounds-dressing C/D/I. Dermabond intact on neck incision. PIV and Capno WNL therefore dc'd when Discharge orders were written. Pt is aware she can not leave the hospital until afternoon. No swallowing issues observed by me or stated by the patient. Ms Oconnor has administered her own insulin via her pump. Her sister is presently visiting her.

## 2019-05-24 LAB — COPATH REPORT: NORMAL

## 2019-06-02 NOTE — OP NOTE
Procedure Date: 05/22/2019      PREOPERATIVE DIAGNOSIS:  Large left thyroid nodule.      POSTOPERATIVE DIAGNOSIS:  Large left thyroid nodule.      SURGICAL PROCEDURE PERFORMED:  Left thyroid lobectomy and isthmusectomy with 40 minutes of intraoperative recurrent laryngeal nerve monitoring.      SURGEON:  Shakira Ogden MD      ASSISTANT:  AZRA Baptiste      ANESTHESIA:  General endotracheal with nerve monitoring endotracheal tube.      COMPLICATIONS:  None.      ESTIMATED BLOOD LOSS:  Less than 20 mL.      DRAINS:  Left in place, left neck FABIOLA.        CLINICAL INDICATIONS FOR THE PROCEDURE:  This is a 43-year-old female who was noted to have a left thyroid nodule, enlarged left thyroid gland.  Ultrasound confirmed a large left thyroid nodule.  A CT scan confirmed that there was indeed a 7 cm left thyroid mass with a mild substernal component.  The right lobe of the thyroid gland appeared normal.  Based on this, it was recommended that the patient undergo a left thyroid lobectomy and isthmusectomy.  The surgical procedure was discussed with the patient, including but not limited to the risks of bleeding, infection, injury to the recurrent laryngeal nerve or nerves, potential permanent hypocalcemia, loss of airway.  The patient is aware of this and agreed to proceed with surgery and consent was obtained, the site was marked.      DETAILS OF PROCEDURE:  The patient was brought to the operating room in stable condition, placed on the operating table in supine position.  After appropriate general anesthesia was obtained, the patient was prepped and draped in sterile fashion.  A timeout was then performed.  A 6 cm incision was made over the anterior neck following natural skin crease.  The platysma was then divided and subplatysmal planes were then created.  As we divided the strap muscles at the midline, and as we attempted to mobilize the left lobe of the thyroid gland, we found it necessary to divide the  left strap muscles.  This permitted safer approach and a better visibility.  At this point, the middle thyroid vein was identified.  It was skeletonized, was surgically tied and/or clipped and then divided.  We were able to rotate the gland from lateral to medial manner, then carried our dissection cephalad.  As we came around the lateral superior portion of the left lobe of the thyroid gland, the superior thyroidal vessels were separately skeletonized, surgically tied and/or clipped and then divided using a LigaSure.  This permitted us to rotate the gland up and out of the operative bed and then medially to identify the left superior parathyroid gland as well as the left recurrent laryngeal nerve.  The left recurrent laryngeal nerve was rather tortuous because of the large left lobe of thyroid.  We were able to follow this from a superior to inferior manner, dissecting the thyroid gland off it anteriorly. As we did so, the left superior parathyroid gland became devascularized.  This was placed in saline for potential reimplantation later.  We carried our dissection inferiorly and identified the left inferior parathyroid gland.  This was safely dissected from the undersurface of the thyroid, maintaining its viability.  The left inferior thyroidal vessels were then separately skeletonized, tied, clipped and then divided using a LigaSure.  We continued rotating the gland from lateral to medial manner, dissecting it over the anterior portion of the trachea and then divided to the right of the isthmus using a LigaSure.  Once the specimen was completely removed, the area was copiously irrigated.  The left inferior parathyroid gland appeared to be intact and viable.  The left recurrent laryngeal nerve was intact visibly and confirmed to be intact with nerve stimulation.  Because of the large void left in the neck, we felt it was necessary to place a drain.  A round FABIOLA drain was placed in the operative bed and exited  inferolaterally to the incision site just in the supraclavicular position.  The drain was then secured in place at the skin using a 3-0 nylon suture.  At this point, again confirmed nerve to be intact and then placed Surgiflo and fibrillar in the operative bed.  The divided strap muscles on the left were reapproximated using a 3-0 Vicryl suture.  The strap muscles at the midline were then reapproximated using a 3-0 chromic interrupted suture.  The platysma was approximated using a 3-0 chromic interrupted suture.  The skin incision was approximated with 5-0 Monocryl running subcuticular suture.  The wound was dressed with Dermabond.  The drain site was dressed with bacitracin and a drain was hooked to bulb suction.  The patient was then extubated and returned to the recovery room in stable condition.  I was present for the entire surgical procedure.         YOLANDA CALABRESE MD             D: 2019   T: 2019   MT: KETTY      Name:     RAE COMBS   MRN:      [id number]        Account:        [id number]   :      1975           Procedure Date: 2019      Document: [id number]

## 2019-06-10 ENCOUNTER — OFFICE VISIT (OUTPATIENT)
Dept: OTOLARYNGOLOGY | Facility: CLINIC | Age: 44
End: 2019-06-10
Payer: COMMERCIAL

## 2019-06-10 VITALS — RESPIRATION RATE: 16 BRPM

## 2019-06-10 DIAGNOSIS — R49.8 VOCAL FATIGUE: Primary | ICD-10-CM

## 2019-06-10 ASSESSMENT — PAIN SCALES - GENERAL: PAINLEVEL: NO PAIN (0)

## 2019-06-10 NOTE — NURSING NOTE
Chief Complaint   Patient presents with     RECHECK     post op follow up visit     Gianna De La Cruz LPN

## 2019-06-10 NOTE — PATIENT INSTRUCTIONS
1.  You were seen in the ENT Clinic today by Dr. Ogden.  If you have any questions or concerns after your appointment, please call [phone]. Press option #1 for scheduling related needs. Press option #3 for Nurse advice.    2.  Please schedule an appointment for the following:   - Speech Therapy - Vocal Fatigue      Zayra Noe LPN  [facility]  [phone]

## 2019-06-10 NOTE — LETTER
6/10/2019       RE: Tanya Oconnor  [address]  [facility] [address]     Dear Colleague,    Thank you for referring your patient, Tanya Oconnor, to the [facility] EAR [facility]SE [facility]AT at [facility]. Please see a copy of my visit note below.    2 weed post op check s/p removal large left thyroid lobectomy. Since the surgery she admits to voice fatigue, but feels as if it is improving.  No problems with inspiration or swallowing. No sx of hypothyroidism.    PE:  Wound is healing well. No evidence of hematoma, seroma or infection. Dermabond already off. Sutures clipped at the skin edge.    Pathology reviewed with patient    Plan  Reviewed wound management and massage  Check TFT's at 6 weeks post op-orders placed  She will keep me posted about her voice. If it does not improve to her satisfaction, will have her see speech path.    Again, thank you for allowing me to participate in the care of your patient.      Sincerely,    Shakira Ogden MD

## 2019-07-11 NOTE — PROGRESS NOTES
2 weed post op check s/p removal large left thyroid lobectomy. Since the surgery she admits to voice fatigue, but feels as if it is improving.  No problems with inspiration or swallowing. No sx of hypothyroidism.    PE:  Wound is healing well. No evidence of hematoma, seroma or infection. Dermabond already off. Sutures clipped at the skin edge.    Pathology reviewed with patient    Plan  Reviewed wound management and massage  Check TFT's at 6 weeks post op-orders placed  She will keep me posted about her voice. If it does not improve to her satisfaction, will have her see speech path.

## 2019-07-12 ENCOUNTER — PRE VISIT (OUTPATIENT)
Dept: OTOLARYNGOLOGY | Facility: CLINIC | Age: 44
End: 2019-07-12

## 2019-07-12 NOTE — TELEPHONE ENCOUNTER
FUTURE VISIT INFORMATION      FUTURE VISIT INFORMATION:    Date: 8/9/19    Time: 12:30pm    Location: [facility]  REFERRAL INFORMATION:    Referring provider:  Shakira Ogden    Referring providers clinic:  [facility]    Reason for visit/diagnosis  6/10/19    RECORDS REQUESTED FROM:       Clinic name Comments Records Status Imaging Status   [facility] OV/referral 6/10/19-[facility]  OV/notes 11/14/19-6/10/19 EPIC

## 2019-07-16 ENCOUNTER — PRE VISIT (OUTPATIENT)
Dept: OTOLARYNGOLOGY | Facility: CLINIC | Age: 44
End: 2019-07-16

## 2019-07-16 NOTE — TELEPHONE ENCOUNTER
FUTURE VISIT INFORMATION      FUTURE VISIT INFORMATION:    Date: 8/12/19    Time: 9:30am    Location: [facility]  REFERRAL INFORMATION:      Referring provider:  Shakira Ogden    Referring providers clinic:  [facility]    Reason for visit/diagnosis  6/10/19     RECORDS REQUESTED FROM:         Clinic name Comments Records Status Imaging Status   [facility] OV/referral 6/10/19-[facility]  OV/notes 11/14/19-6/10/19 EPIC

## 2019-08-12 ENCOUNTER — OFFICE VISIT (OUTPATIENT)
Dept: OTOLARYNGOLOGY | Facility: CLINIC | Age: 44
End: 2019-08-12
Payer: COMMERCIAL

## 2019-08-12 DIAGNOSIS — R49.0 DYSPHONIA: Primary | ICD-10-CM

## 2019-08-12 DIAGNOSIS — J38.01 UNILATERAL VOCAL FOLD PARESIS: ICD-10-CM

## 2019-08-12 NOTE — PROGRESS NOTES
[facility] VOICE [facility]INIC  Jh Brown Jr., M.D., F.A.C.S.  Leonela Conti M.D., M.P.H.  Aimee Alford, Ph.D., CCC/SLP  Enedelia Moore M.M. (voice), M.KELLEY., CCC/SLP  Byron Gary M.M. (voice), M.A., CCC/SLP    [facility] VOICE [facility]  VOICE/SPEECH/BREATHING EVALUATION AND LARYNGEAL EXAMINATION REPORT    Patient: Tanya Oconnor  Date of Visit: 8/12/2019    Clinician: Aimee Alford, Ph.D., CCC/SLP  Referring Physician: Shakira Ogden    CHIEF COMPLAINT:  voice    HISTORY  Tanya Oconnor is a 44 year old presenting today for evaluation of dysphonia.    Salient details of her history are as follows:    Thyroid lobectomy on 5/22/19    Dr. 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 Ogden 6/10/19; referred for evaluation; no endoscopic exam at the time, with the assumption that one would be done soon    There is no validation of a vocal fold paralysis or paresis, though it was assumed to be present    Her voice was very poor for about 6 weeks; in the past week her voice has improved and she now feels that's she about 80% of normal    There was a sudden improvement, and now has reached a plateau    She is still more easily fatigued    There are still occasions of mild inspiratory stridor when exerting    Cough is still weak    DIAGNOSIS/REASON FOR REFERRAL  Dysphonia/ Evaluate, perform laryngeal exam, treat as appropriate    Patient Supplied Answers To [facility] Intake General Questionnaire  [facility] Intake - General Form Review 8/11/2019   Are you having any of these symptoms? Throat irritation, Mucus in throat, Frequent  throat-clearing, Poor voice quality   Are you having any of these symptoms? Voice tires easily, Voice breaks, Change in vocal pitch, Change in vocal volume, Change in range, Breathy voice   Do you use caffeine? Yes   If you do use caffeine, how many oz. do you typically drink in a day? 3-4 servings   How many oz. of non-caffeinated fluid do you typically drink in a day? ~64 oz   How often do you experience heartburn, indigestion, chest pain, stomach acid coming up, and/or tasting acid in your mouth or throat? Monthly   Have reflux medications been recommended to you? No   Voice: Yes   Swallowing: Yes   Cough and/or throat-clearing: Yes   Breathing problem: Yes   Throat discomfort and/or the feeling of a lump in your throat: No   A different problem, not listed above: No   Other physicians/health care providers who should receive a copy of today's note (please provide first and last name(s), city): Dr. Suni Lee, [facility]), Dr. Viv Ogden, [facility]       Patient Supplied Answers To [facility] Intake Voice Questionnaire  [facility] Intake - Voice Review 8/11/2019   How long have you had this voice problem? since 5/22/19   In regards to your voice problem, was there anything unusual about the time the problem was first noticed (such as illness, accident, surgery, etc.)?  If yes, please describe.  You have 200 characters to respond.  We will ask for more detail at your visit. Began after thyroid lobectomy   What do you think caused this voice problem? see above   How quickly did the voice problem develop? Suddenly   For your voice problem, how do the symptoms vary? Worse after heavy voice use, Variable, Unpredictable   Over time, how has the voice problem changed? Better   How would you rate your typical vocal demand? Routine: frequent periods of talking on most days; most talking is conversational speech   Please list activities that involve your voice (such as singing, coaching,  etc.): I work in a hospital, so on days when I'm seeing patients, I talk a lot. On days when I'm not at work, I usually talk a lot less.   Effort for speaking 3   Effort for singing 3   Overall vocal quality 4   Is your voice ever normal, even briefly? Yes   What health care professionals have you seen for this voice problem?  Who and when? Saw Dr. Ogden 2 weeks postop (early June 2019)   For your voice problem, what testing/studies have you done (such as imaging/reflux testing)? none   Did you receive any therapy or treatment for your voice problem?  Please describe briefly. no   Prior to this episode, have you ever had a voice problem before?  If yes, at that time did you have therapy or treatment for the voice problem?  Please provide a brief description of that treatment. no   There isn't much I can do to help myself feel better about this problem. Disagree somewhat   How I deal with the voice problem now is under my control. Agree somewhat   I don't have much control over my emotional reactions to this problem. Disagree somewhat   When I am upset about the voice problem, I can find a way to feel better. Agree somewhat   I have control over my day-to-day reactions to the voice problem. Agree somewhat   There isn't much I can do to keep the voice problem from affecting me. Disagree somewhat   I have control over how I think about the voice problem. Agree somewhat   My reaction to the voice problem is not under my control. Disagree somewhat   VPCMEAN 2.5       Patient Supplied Answers To VHI Questionnaire  Voice Handicap Index (VHI-10) 7/29/2019   My voice makes it difficult for people to hear me 1   People have difficulty understanding me in a noisy room 1   My voice difficulties restrict my personal and social life.  0   I feel left out of conversations because of my voice 1   My voice problem causes me to lose income 0   I feel as though I have to strain to produce voice 2   The clarity of my voice is  "unpredictable 2   My voice problem upsets me 2   My voice makes me feel handicapped 1   People ask, \"What's wrong with your voice?\" 1   VHI-10 11       Patient Supplied Answers To [facility] Intake Cough/Throat-Clearing Questionnaire  [facility] Intake - Cough/Throat-Clearing Review 8/11/2019   How long have you had this cough/throat-clearing problem? Since May 22, 2019   Was there anything unusual about the time this cough/throat-clearing problem was first noticed (such as illness, accident, surgery, etc)? If yes, please describe.  You have 200 characters to respond.  We will ask for more detail at your visit. underwent thyroid lobectomy   What do you think caused this cough/throat-clearing problem? thyroid lobectomy   How quickly did the cough/throat-clearing problem develop? Suddenly   How do the cough/throat-clearing symptoms vary? Worse with exertion, Variable, Unpredictable, On and off   Over time, how has the cough/throat-clearing problem changed? Better   Is there a tickle in your throat prior to coughing or throat clearing? No   What % of the time do you feel like you can control the urge to cough? 75%   Do any of the following trigger your cough? Cold air, Talking, Laughing, Drinking, Trying to suppress a cough   What health care providers have you seen for this cough/throat-clearing problem? Who and when? none     Patient Supplied Answers To CSI Questionnaire  No flowsheet data found.    CURRENT PATIENT COMPLAINTS    Primary: voice    Secondary: cough (mild concern)    Denies significant dyspnea, dysphagia, or pain    OTHER PERTINENT HISTORY    Unknown; Please refer to chart for additional history    OBJECTIVE FINDINGS  VOICE/ SPEECH/ NON-COMMUNICATIVE LARYNGEAL BEHAVIORS EVALUATION  An evaluation of the voice was accomplished today; salient features are as follows:    Breathing pattern:    Appears within normal limits and adequate     Evaluation of breathing during exertion was not done today    Tension is " "evident:    Neck    Voice quality is characterized by    Generally clear and resonant; no roughness, breathiness, or strain    Occasional diplophonia    Habitual pitch is WNL at F3 to A3, with some extensions to higher pitches    Loudness is WNL and appropriate for the setting    In a task comparing voiced and voiceless phonemes, there is no difference    Sustained vowels: neck muscle involvement in producing a sustained vowel    Comfortable pitch /a/: Eb3 - clear and resonant, though probably lower than optimal    Comfortable pitch /i/: Eb3 - same as /a/    A singing task shows voice quality is consistent between speech and singing, with good quality in the range from F3 to F4    In a pitch glide task to determine pitch range, she demonstrates normal range; there is a considerable phonation break in the middle, but this is determined to be due to lack of comfort with the task    Highest pitch: B5    Lowest pitch: A2    she states today is typical of her morning voice, before she has been to work    GLOBAL ASSESSMENT OF DYSPHONIA: 20/100    CAPE-V Overall Severity:   9/100    AERODYNAMIC AND ACOUSTIC EVALUATION (CPT 09197 - Laryngeal Function Studies)  Laryngeal Function Studies (CPT 37103)     Acoustic Measures     Protocol / Parameter = result     Fundamental frequency Metrics        /a/ mean F0 = 153 Hz (SD = 2.41 Hz)        /i/ mean F0 = 157 Hz (SD = 2.07 Hz)        [place] Mean f0 = 161 Hz (SD 33.34 Hz)       Glide:            Min F0 = 110 Hz           Max F0 = 981 Hz           Range = 871 Hz           Notes = pitch measures are WNL     Cepstral Measures        CPPS /a/ = 29.83 dB        CPPS /i/ = 20.11 dB        CPPS \"all voiced\" = 20.84 dB        AVQI (v.3.01) = 2.16     Additional Measures        Harmonic to Noise Ratio /a/ = 23.72 dB        Harmonic to Noise Ratio: [place] = 12.52 dB       Jitter (local) /a/ = 0.487 %        Shimmer (local) /a/ = 1.557 %     Aerodynamic Measures   "   Protocol / Parameter Result Normative Mean (SD)   Vital Capacity     Expiratory Volume 3.71 Liters 3.01 (0.7) Liters   Comfortable Sustained Phonation     Mean SPL 74.62 dB 77.53 (3.9) dB   Mean Pitch 164.07 Hz 197.7 (23.92) Hz   Peak Expiratory Airflow 0.578 Lit/Sec 0.21 (0.09) Lit/Sec   Mean Expiratory Airflow 0.167 Lit/Sec 0.15 (0.07) Lit/Sec   Voicing Efficiency     Peak Air Pressure 8.96 cm H2O 7.49 (2.64) cm H2O   Mean Peak Air Pressure 5.88 cm H2O 5.76 (1.51) cm H2O   Peak Expiratory Airflow 0.216 Lit/Sec 0.22 (0.1) Lit/Sec   Mean Airflow During Voicing 0.139 Lit/Sec 0.13 (0.06) Lit/Sec   Running Speech: All Voiced Stimulus     Mean SPL 62.9 dB    Mean Pitch 160.87 Hz    Peak Expiratory Airflow 0.475 Lit/Sec    Mean Expiratory Airflow 0.116 Lit/Sec    Mean Airflow During Voicing 0.107 Lit/Sec    Running Speech: [place]     Mean SPL 63.11 dB    SPL Range 43 dB    Mean Pitch 164.51 Hz    Pitch Range 264.21 Hz    Peak Expiratory Airflow 0.886 Lit/Sec    Mean Expiratory Airflow 0.156 Lit/Sec    Expiratory Volume 2.44 Liters    Mean Airflow During Voicing 0.137 Lit/Sec    Peak Inspiratory Airflow -2.191 Lit/Sec    Inspiratory Volume -0.8 Liters            LARYNGEAL EXAMINATION    Endoscopic laryngeal examination was performed by:  Aimee Alford, Ph.D., CCC/SLP  Verbal informed consent was obtained and witnessed prior to this procedure.   Type of exam:   Flexible endoscopy with chip-tip technology and stroboscopy, left nostril; topical anesthesia with 3% Lidocaine and 0.25% phenylephrine was applied.    This exam shows:    Laryngeal and Vocal Fold Mucosa    Essentially healthy laryngeal mucosa    mild presence of thickened secretions on the vocal folds and throughout the laryngeal area, consistent with recent URI    Status of vocal fold mucosa:   o Within normal limits, with no lesions and straight vibratory margins    Neurological and Functional Integrity of the Larynx    Vocal folds are mobile  "and meet at midline  o Left vocal fold is paretic; it moves sluggishly but can achieve full abduction  o It comes nearly to midline, but in a mildly bowed configuration    Right vocal fold comes to midline to provide near normal glottic closure    Airway is adequate    Left arytenoid position is droopy    Mild left sluggishness is evident during a task of 20 quickly repeated vowels    Elongation of the vocal folds for pitch increase is WNL    On phonation, glottic closure is mildly weak at the midfold  o There is a left-sided spindle-shaped glottic gap    Vertical level is equal    Supraglottic Function and Therapy Probes    Mild to moderate four-way constriction of the supraglottic larynx during connected speech    Supraglottic function during singing is somewhat improved    Therapy probes show   o Reduction in supraglottic hyperfunction when instruction to talk \"across the room\"    Stroboscopy provided the following additional information:    Glottic closure is only mildly weak; open and closed phases are essentially balanced    The mucosal wave is within normal limits in periodicity, amplitude, symmetry, and phase    The mucosal wave is within normal limits in periodicity, symmetry, and phase, but limited in amplitude on the left     I reviewed this laryngeal exam with Dr. Oconnor today, and I provided pertinent explanations, as well as written information:    Endoscopic findings are consistent with audio-perceptual assessment and patient Hx/complaints.    her symptoms are accounted for by the mild residual weakness of the left vocal fold, resulting in mild impairment in glottic closure and mucosal wave     Because she has had good improvement lately, and is doing well enough, no intervention is advised at this point    It would be prudent to undergo another laryngeal exam in a year, to see if full mobility of the left vocal fold has been achieved          ASSESSMENT / PLAN  IMPRESSIONS:  This evaluation has " resulted in the following diagnosis/diagnoses for Ms. 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 Oconnor had been at work; it is anticipated that the evaluation would have shown increased dysphonia after a day of voice use    Laryngeal function studies demonstrated all acoustic and aerodynamic measures are WNL, and do not reflect the residual left vocal fold weakness, but do reflect the perceptual impression of normal voice quality  STIMULABILITY: results of therapy probes during laryngeal evaluation suggest that supraglottic hyperfunction is reduced when there is a balanced, projected vocal production  RECOMMENDATIONS:     No intervention seems warranted at this time.  I provided  Anastasia with suggestions for how she can optimize her continuing improvement in both voice and breathing.  I also provided a checklist of factors that would indicate she has not optimized her voice and could benefit from speech therapy.  Finally, I did recommend that she repeat the laryngeal examination in a year, to discover if the left vocal fold has achieved full mobility.      There are no goals and Ms. Oconnor is discharged from this service.    TOTAL SERVICE TIME: 105 minutes  EVALUATION OF VOICE AND RESONANCE (86505)  LARYNGEAL FUNCTION STUDIES (19804)  ENDOSCOPIC LARYNGEAL EXAMINATION WITH STROBOSCOPY (32991)  NO CHARGE FACILITY FEE (60041)      Aimee Alford, Ph.D., [facility]-SLP  Speech-Language Pathologist  Director, [facility]  [phone]            Answers for HPI/ROS submitted by the patient on  8/11/2019   CMEAN: 2.5

## 2019-08-12 NOTE — LETTER
8/12/2019       RE: Tanya Oconnor  [address]  [facility] [address]     Dear Colleague,    Thank you for referring your patient, Tanya Oconnor, to the [facility] at [facility]. Please see a copy of my visit note below.    [facility] VOICE [facility]  Jh Brown Jr., M.D., F.A.C.S.  Leonela Conti M.D., M.P.H.  Aimee Alford, Ph.D., CCC/SLP  Enedelia Moore M.M. (voice), M.A., CCC/SLP  Byron Gary M.M. (voice), M.A., CCC/SLP    [facility]  VOICE/SPEECH/BREATHING EVALUATION AND LARYNGEAL EXAMINATION REPORT    Patient: Tanya Oconnor  Date of Visit: 8/12/2019    Clinician: Aimee Alford, Ph.D., CCC/SLP  Referring Physician: Shakira Ogden    CHIEF COMPLAINT:  voice    HISTORY  Tanya Oconnor is a 44 year old presenting today for evaluation of dysphonia.    Salient details of her history are as follows:    Thyroid lobectomy on 5/22/19    Dr. 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 Ogden 6/10/19; referred for evaluation; no endoscopic exam at the time, with the assumption that one would be done soon    There is no validation of a vocal fold paralysis or paresis, though it was assumed to be present    Her voice was very poor for about 6 weeks; in the past week her voice has improved and she now feels that's she about 80% of normal    There was a sudden improvement, and now has reached a plateau    She is still more easily fatigued    There are still occasions of mild inspiratory stridor when exerting    Cough is still weak    DIAGNOSIS/REASON FOR  REFERRAL  Dysphonia/ Evaluate, perform laryngeal exam, treat as appropriate    [facility] To [facility]  [facility] Intake - General Form Review 8/11/2019   Are you having any of these symptoms? Throat irritation, Mucus in throat, Frequent throat-clearing, Poor voice quality   Are you having any of these symptoms? Voice tires easily, Voice breaks, Change in vocal pitch, Change in vocal volume, Change in range, Breathy voice   Do you use caffeine? Yes   If you do use caffeine, how many oz. do you typically drink in a day? 3-4 servings   How many oz. of non-caffeinated fluid do you typically drink in a day? ~64 oz   How often do you experience heartburn, indigestion, chest pain, stomach acid coming up, and/or tasting acid in your mouth or throat? Monthly   Have reflux medications been recommended to you? No   Voice: Yes   Swallowing: Yes   Cough and/or throat-clearing: Yes   Breathing problem: Yes   Throat discomfort and/or the feeling of a lump in your throat: No   A different problem, not listed above: No   Other physicians/health care providers who should receive a copy of today's note (please provide first and last name(s), city): Dr. Suni Lee, [facility] Dr. Viv Ogden, [facility] head & neck surgery       [facility] Answers To [facility] Questionnaire  [facility] Intake - Voice Review 8/11/2019   How long have you had this voice problem? since 5/22/19   In regards to your voice problem, was there anything unusual about the time the problem was first noticed (such as illness, accident, surgery, etc.)?  If yes, please describe.  You have 200 characters to respond.  We will ask for more detail at your visit. Began after thyroid lobectomy   What do you think caused this voice problem? see above   How quickly did the voice problem develop? Suddenly   For your voice problem, how do the symptoms vary? Worse after heavy voice use, Variable,  Unpredictable   Over time, how has the voice problem changed? Better   How would you rate your typical vocal demand? Routine: frequent periods of talking on most days; most talking is conversational speech   Please list activities that involve your voice (such as singing, coaching, etc.): I work in a hospital, so on days when I'm seeing patients, I talk a lot. On days when I'm not at work, I usually talk a lot less.   Effort for speaking 3   Effort for singing 3   Overall vocal quality 4   Is your voice ever normal, even briefly? Yes   What health care professionals have you seen for this voice problem?  Who and when? Saw Dr. Ogden 2 weeks postop (early June 2019)   For your voice problem, what testing/studies have you done (such as imaging/reflux testing)? none   Did you receive any therapy or treatment for your voice problem?  Please describe briefly. no   Prior to this episode, have you ever had a voice problem before?  If yes, at that time did you have therapy or treatment for the voice problem?  Please provide a brief description of that treatment. no   There isn't much I can do to help myself feel better about this problem. Disagree somewhat   How I deal with the voice problem now is under my control. Agree somewhat   I don't have much control over my emotional reactions to this problem. Disagree somewhat   When I am upset about the voice problem, I can find a way to feel better. Agree somewhat   I have control over my day-to-day reactions to the voice problem. Agree somewhat   There isn't much I can do to keep the voice problem from affecting me. Disagree somewhat   I have control over how I think about the voice problem. Agree somewhat   My reaction to the voice problem is not under my control. Disagree somewhat   VPCMEAN 2.5       Patient Supplied Answers To VHI Questionnaire  Voice Handicap Index (VHI-10) 7/29/2019   My voice makes it difficult for people to hear me 1   People have difficulty  "understanding me in a noisy room 1   My voice difficulties restrict my personal and social life.  0   I feel left out of conversations because of my voice 1   My voice problem causes me to lose income 0   I feel as though I have to strain to produce voice 2   The clarity of my voice is unpredictable 2   My voice problem upsets me 2   My voice makes me feel handicapped 1   People ask, \"What's wrong with your voice?\" 1   VHI-10 11       [facility]Thro[facility]-Clearing Questionnaire  [facility] Intake - Cough/Throat-Clearing Review 8/11/2019   How long have you had this cough/throat-clearing problem? Since May 22, 2019   Was there anything unusual about the time this cough/throat-clearing problem was first noticed (such as illness, accident, surgery, etc)? If yes, please describe.  You have 200 characters to respond.  We will ask for more detail at your visit. underwent thyroid lobectomy   What do you think caused this cough/throat-clearing problem? thyroid lobectomy   How quickly did the cough/throat-clearing problem develop? Suddenly   How do the cough/throat-clearing symptoms vary? Worse with exertion, Variable, Unpredictable, On and off   Over time, how has the cough/throat-clearing problem changed? Better   Is there a tickle in your throat prior to coughing or throat clearing? No   What % of the time do you feel like you can control the urge to cough? 75%   Do any of the following trigger your cough? Cold air, Talking, Laughing, Drinking, Trying to suppress a cough   What health care providers have you seen for this cough/throat-clearing problem? Who and when? none     Patient Supplied Answers To CSI Questionnaire  No flowsheet data found.    CURRENT PATIENT COMPLAINTS    Primary: voice    Secondary: cough (mild concern)    Denies significant dyspnea, dysphagia, or pain    OTHER PERTINENT HISTORY    Unknown; Please refer to chart for additional history    OBJECTIVE FINDINGS  VOICE/ " SPEECH/ NON-COMMUNICATIVE LARYNGEAL BEHAVIORS EVALUATION  An evaluation of the voice was accomplished today; salient features are as follows:    Breathing pattern:    Appears within normal limits and adequate     Evaluation of breathing during exertion was not done today    Tension is evident:    Neck    Voice quality is characterized by    Generally clear and resonant; no roughness, breathiness, or strain    Occasional diplophonia    Habitual pitch is WNL at F3 to A3, with some extensions to higher pitches    Loudness is WNL and appropriate for the setting    In a task comparing voiced and voiceless phonemes, there is no difference    Sustained vowels: neck muscle involvement in producing a sustained vowel    Comfortable pitch /a/: Eb3 - clear and resonant, though probably lower than optimal    Comfortable pitch /i/: Eb3 - same as /a/    A singing task shows voice quality is consistent between speech and singing, with good quality in the range from F3 to F4    In a pitch glide task to determine pitch range, she demonstrates normal range; there is a considerable phonation break in the middle, but this is determined to be due to lack of comfort with the task    Highest pitch: B5    Lowest pitch: A2    she states today is typical of her morning voice, before she has been to work    GLOBAL ASSESSMENT OF DYSPHONIA: 20/100    CAPE-V Overall Severity:   9/100    AERODYNAMIC AND ACOUSTIC EVALUATION (CPT 92598 - Laryngeal Function Studies)  Laryngeal Function Studies (CPT 50292)     Acoustic Measures     Protocol / Parameter = result     Fundamental frequency Metrics        /a/ mean F0 = 153 Hz (SD = 2.41 Hz)        /i/ mean F0 = 157 Hz (SD = 2.07 Hz)        [place] Mean f0 = 161 Hz (SD 33.34 Hz)       Glide:            Min F0 = 110 Hz           Max F0 = 981 Hz           Range = 871 Hz           Notes = pitch measures are WNL     Cepstral Measures        CPPS /a/ = 29.83 dB        CPPS /i/ = 20.11 dB        CPPS  "\"all voiced\" = 20.84 dB        AVQI (v.3.01) = 2.16     Additional Measures        Harmonic to Noise Ratio /a/ = 23.72 dB        Harmonic to Noise Ratio: [place] = 12.52 dB       Jitter (local) /a/ = 0.487 %        Shimmer (local) /a/ = 1.557 %     Aerodynamic Measures     Protocol / Parameter Result Normative Mean (SD)   Vital Capacity     Expiratory Volume 3.71 Liters 3.01 (0.7) Liters   Comfortable Sustained Phonation     Mean SPL 74.62 dB 77.53 (3.9) dB   Mean Pitch 164.07 Hz 197.7 (23.92) Hz   Peak Expiratory Airflow 0.578 Lit/Sec 0.21 (0.09) Lit/Sec   Mean Expiratory Airflow 0.167 Lit/Sec 0.15 (0.07) Lit/Sec   Voicing Efficiency     Peak Air Pressure 8.96 cm H2O 7.49 (2.64) cm H2O   Mean Peak Air Pressure 5.88 cm H2O 5.76 (1.51) cm H2O   Peak Expiratory Airflow 0.216 Lit/Sec 0.22 (0.1) Lit/Sec   Mean Airflow During Voicing 0.139 Lit/Sec 0.13 (0.06) Lit/Sec   Running Speech: All Voiced Stimulus     Mean SPL 62.9 dB    Mean Pitch 160.87 Hz    Peak Expiratory Airflow 0.475 Lit/Sec    Mean Expiratory Airflow 0.116 Lit/Sec    Mean Airflow During Voicing 0.107 Lit/Sec    Running Speech: [place]     Mean SPL 63.11 dB    SPL Range 43 dB    Mean Pitch 164.51 Hz    Pitch Range 264.21 Hz    Peak Expiratory Airflow 0.886 Lit/Sec    Mean Expiratory Airflow 0.156 Lit/Sec    Expiratory Volume 2.44 Liters    Mean Airflow During Voicing 0.137 Lit/Sec    Peak Inspiratory Airflow -2.191 Lit/Sec    Inspiratory Volume -0.8 Liters            LARYNGEAL EXAMINATION    Endoscopic laryngeal examination was performed by:  Aimee Alford, Ph.D., CCC/SLP  Verbal informed consent was obtained and witnessed prior to this procedure.   Type of exam:   Flexible endoscopy with chip-tip technology and stroboscopy, left nostril; topical anesthesia with 3% Lidocaine and 0.25% phenylephrine was applied.    This exam shows:    Laryngeal and Vocal Fold Mucosa    Essentially healthy laryngeal mucosa    mild presence of " "thickened secretions on the vocal folds and throughout the laryngeal area, consistent with recent URI    Status of vocal fold mucosa:   o Within normal limits, with no lesions and straight vibratory margins    Neurological and Functional Integrity of the Larynx    Vocal folds are mobile and meet at midline  o Left vocal fold is paretic; it moves sluggishly but can achieve full abduction  o It comes nearly to midline, but in a mildly bowed configuration    Right vocal fold comes to midline to provide near normal glottic closure    Airway is adequate    Left arytenoid position is droopy    Mild left sluggishness is evident during a task of 20 quickly repeated vowels    Elongation of the vocal folds for pitch increase is WNL    On phonation, glottic closure is mildly weak at the midfold  o There is a left-sided spindle-shaped glottic gap    Vertical level is equal    Supraglottic Function and Therapy Probes    Mild to moderate four-way constriction of the supraglottic larynx during connected speech    Supraglottic function during singing is somewhat improved    Therapy probes show   o Reduction in supraglottic hyperfunction when instruction to talk \"across the room\"    Stroboscopy provided the following additional information:    Glottic closure is only mildly weak; open and closed phases are essentially balanced    The mucosal wave is within normal limits in periodicity, amplitude, symmetry, and phase    The mucosal wave is within normal limits in periodicity, symmetry, and phase, but limited in amplitude on the left     I reviewed this laryngeal exam with Dr. Oconnor today, and I provided pertinent explanations, as well as written information:    Endoscopic findings are consistent with audio-perceptual assessment and patient Hx/complaints.    her symptoms are accounted for by the mild residual weakness of the left vocal fold, resulting in mild impairment in glottic closure and mucosal wave     Because she has had " good improvement lately, and is doing well enough, no intervention is advised at this point    It would be prudent to undergo another laryngeal exam in a year, to see if full mobility of the left vocal fold has been achieved          ASSESSMENT / PLAN  IMPRESSIONS:  This evaluation has resulted in the following diagnosis/diagnoses for Ms. 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 Oconnor had been at work; it is anticipated that the evaluation would have shown increased dysphonia after a day of voice use    Laryngeal function studies demonstrated all acoustic and aerodynamic measures are WNL, and do not reflect the residual left vocal fold weakness, but do reflect the perceptual impression of normal voice quality  STIMULABILITY: results of therapy probes during laryngeal evaluation suggest that supraglottic hyperfunction is reduced when there is a balanced, projected vocal production  RECOMMENDATIONS:     No intervention seems warranted at this time.  I provided Dr. Oconnor with suggestions for how she can optimize her continuing improvement in both voice and breathing.  I also provided a checklist of factors that would indicate she has not optimized her voice and could benefit from speech therapy.  Finally, I did recommend that she repeat the laryngeal examination in a year, to discover if the left vocal fold has achieved full mobility.      There are no goals and Ms. Oconnor is discharged from this service.    TOTAL SERVICE TIME: 105 minutes  EVALUATION OF VOICE AND RESONANCE (55283)  LARYNGEAL  FUNCTION STUDIES (15992)  ENDOSCOPIC LARYNGEAL EXAMINATION WITH STROBOSCOPY (46081)  NO CHARGE FACILITY FEE (28976)      Aimee Alford, Ph.D., [facility]-SLP  Speech-Language Pathologist  Director, [facility]  [phone]            Answers for HPI/ROS submitted by the patient on 8/11/2019   VPCMEAN: 2.5

## 2019-10-02 ENCOUNTER — HEALTH MAINTENANCE LETTER (OUTPATIENT)
Age: 44
End: 2019-10-02

## 2021-01-15 ENCOUNTER — HEALTH MAINTENANCE LETTER (OUTPATIENT)
Age: 46
End: 2021-01-15

## 2021-01-24 ENCOUNTER — HEALTH MAINTENANCE LETTER (OUTPATIENT)
Age: 46
End: 2021-01-24

## 2021-09-04 ENCOUNTER — HEALTH MAINTENANCE LETTER (OUTPATIENT)
Age: 46
End: 2021-09-04

## 2022-02-19 ENCOUNTER — HEALTH MAINTENANCE LETTER (OUTPATIENT)
Age: 47
End: 2022-02-19

## 2022-10-22 ENCOUNTER — HEALTH MAINTENANCE LETTER (OUTPATIENT)
Age: 47
End: 2022-10-22

## 2023-04-01 ENCOUNTER — HEALTH MAINTENANCE LETTER (OUTPATIENT)
Age: 48
End: 2023-04-01

## (undated) DEVICE — SOL NACL 0.9% IRRIG 1000ML BOTTLE 2F7124

## (undated) DEVICE — DRSG TEGADERM 2 3/8X2 3/4" 1624W

## (undated) DEVICE — SU CHROMIC 3-0 SH 27" G122H

## (undated) DEVICE — ESU LIGASURE OPEN SEALER/DIVIDER SM JAW 16.5MM LF1212A

## (undated) DEVICE — DRAIN JACKSON PRATT RESERVOIR 100ML SU130-1305

## (undated) DEVICE — SU SILK 3-0 TIE 12X30" A304H

## (undated) DEVICE — SU MONOCRYL 5-0 P-3 18" UND Y493G

## (undated) DEVICE — SU VICRYL 3-0 SH 27" UND J416H

## (undated) DEVICE — CLIP HORIZON MED BLUE 002200

## (undated) DEVICE — SOL WATER IRRIG 1000ML BOTTLE 2F7114

## (undated) DEVICE — SPONGE KITTNER 30-101

## (undated) DEVICE — SU SILK 2-0 TIE 12X30" A305H

## (undated) DEVICE — GLOVE PROTEXIS W/NEU-THERA 6.5  2D73TE65

## (undated) DEVICE — ESU GROUND PAD ADULT W/CORD E7507

## (undated) DEVICE — SU ETHILON 3-0 PS-1 18" 1663H

## (undated) DEVICE — PACK NEURO MINOR UMMC SNE32MNMU4

## (undated) DEVICE — LIGASURE EXACT DISSECTOR

## (undated) DEVICE — DRAIN JACKSON PRATT CHANNEL 15FR ROUND HUBLESS SIL JP-2228

## (undated) DEVICE — LINEN TOWEL PACK X6 WHITE 5487

## (undated) DEVICE — ESU PENCIL SMOKE EVAC W/ROCKER SWITCH 0703-047-000

## (undated) DEVICE — ADHESIVE SWIFTSET 0.8ML OCTYL SS6

## (undated) DEVICE — ESU ELEC BLADE 2.75" COATED/INSULATED E1455

## (undated) DEVICE — SU SILK 4-0 TIE 12X30" A303H

## (undated) DEVICE — SUCTION SLEEVE NEPTUNE 2 125MM 0703-005-125

## (undated) DEVICE — DRSG PRIMAPORE 02X3" 7133

## (undated) DEVICE — CLIP HORIZON SM RED WIDE SLOT 001201

## (undated) DEVICE — LINEN TOWEL PACK X5 5464

## (undated) DEVICE — SU SILK 3-0 SH CR 8X18" C013D

## (undated) DEVICE — PREP CHLORAPREP 26ML TINTED ORANGE  260815

## (undated) DEVICE — NIM PROBE PRASS INCREMENTING TIP 8225825

## (undated) RX ORDER — HYDROMORPHONE HYDROCHLORIDE 1 MG/ML
INJECTION, SOLUTION INTRAMUSCULAR; INTRAVENOUS; SUBCUTANEOUS
Status: DISPENSED
Start: 2019-05-22

## (undated) RX ORDER — FENTANYL CITRATE 50 UG/ML
INJECTION, SOLUTION INTRAMUSCULAR; INTRAVENOUS
Status: DISPENSED
Start: 2019-05-22

## (undated) RX ORDER — ACETAMINOPHEN 325 MG/1
TABLET ORAL
Status: DISPENSED
Start: 2019-05-22

## (undated) RX ORDER — PROPOFOL 10 MG/ML
INJECTION, EMULSION INTRAVENOUS
Status: DISPENSED
Start: 2019-05-22

## (undated) RX ORDER — ONDANSETRON 2 MG/ML
INJECTION INTRAMUSCULAR; INTRAVENOUS
Status: DISPENSED
Start: 2019-05-22

## (undated) RX ORDER — PHENYLEPHRINE HCL IN 0.9% NACL 1 MG/10 ML
SYRINGE (ML) INTRAVENOUS
Status: DISPENSED
Start: 2019-05-22

## (undated) RX ORDER — OXYCODONE HCL 5 MG/5 ML
SOLUTION, ORAL ORAL
Status: DISPENSED
Start: 2019-05-22

## (undated) RX ORDER — SCOLOPAMINE TRANSDERMAL SYSTEM 1 MG/1
PATCH, EXTENDED RELEASE TRANSDERMAL
Status: DISPENSED
Start: 2019-05-22